# Patient Record
Sex: FEMALE | Race: WHITE | Employment: UNEMPLOYED | URBAN - METROPOLITAN AREA
[De-identification: names, ages, dates, MRNs, and addresses within clinical notes are randomized per-mention and may not be internally consistent; named-entity substitution may affect disease eponyms.]

---

## 2017-01-10 ENCOUNTER — OFFICE VISIT (OUTPATIENT)
Dept: FAMILY MEDICINE CLINIC | Facility: CLINIC | Age: 57
End: 2017-01-10

## 2017-01-10 VITALS
HEART RATE: 80 BPM | SYSTOLIC BLOOD PRESSURE: 140 MMHG | DIASTOLIC BLOOD PRESSURE: 84 MMHG | WEIGHT: 144 LBS | OXYGEN SATURATION: 98 % | BODY MASS INDEX: 23 KG/M2 | TEMPERATURE: 99 F | RESPIRATION RATE: 20 BRPM

## 2017-01-10 DIAGNOSIS — R05.9 COUGH: Primary | ICD-10-CM

## 2017-01-10 PROCEDURE — 99213 OFFICE O/P EST LOW 20 MIN: CPT | Performed by: FAMILY MEDICINE

## 2017-01-10 RX ORDER — AZITHROMYCIN 250 MG/1
TABLET, FILM COATED ORAL
Qty: 6 TABLET | Refills: 0 | Status: SHIPPED | COMMUNITY
Start: 2017-01-10 | End: 2018-01-09

## 2017-01-10 NOTE — PROGRESS NOTES
CHIEF COMPLAINT:   Cough  HPI:   Debbie Boyd is a 60year old female who presents for upper respiratory symptoms for  1  months. Patient reports congestion. Feels like there is an area on her left neck where it feels constricted with swallowing.   Started chest pain on exertion and palpitations. GI: no nausea or abdominal pain  NEURO: denies headaches or dizziness.     EXAM:   /84 mmHg  Pulse 80  Temp(Src) 98.6 °F (37 °C)  Resp 20  Wt 144 lb  SpO2 98%  GENERAL: well developed, well nourished,in no brenda

## 2017-04-13 ENCOUNTER — HOSPITAL ENCOUNTER (OUTPATIENT)
Dept: CT IMAGING | Facility: HOSPITAL | Age: 57
Discharge: HOME | End: 2017-04-13
Attending: FAMILY MEDICINE

## 2017-04-13 DIAGNOSIS — Z13.9 SCREENING PROCEDURE: ICD-10-CM

## 2018-01-09 PROCEDURE — 88175 CYTOPATH C/V AUTO FLUID REDO: CPT | Performed by: OBSTETRICS & GYNECOLOGY

## 2018-01-09 PROCEDURE — 87624 HPV HI-RISK TYP POOLED RSLT: CPT | Performed by: OBSTETRICS & GYNECOLOGY

## 2018-01-18 PROCEDURE — 82607 VITAMIN B-12: CPT | Performed by: OBSTETRICS & GYNECOLOGY

## 2018-01-25 ENCOUNTER — PATIENT MESSAGE (OUTPATIENT)
Dept: FAMILY MEDICINE CLINIC | Facility: CLINIC | Age: 58
End: 2018-01-25

## 2018-01-26 NOTE — TELEPHONE ENCOUNTER
Called to pt and given findings, and provider recommendations. Pt voiced understanding. Advised pt that I would need to discuss times available with Erna PATEL and call her back.   Pt voiced understanding

## 2018-01-26 NOTE — TELEPHONE ENCOUNTER
From: Georges Jean  To: Mita Jack  Sent: 1/25/2018 10:44 PM CST  Subject: Test Results Question    Dr. Emili Wiley ordered blood tests as a routine part of my annual ob/gyn physical. Upon receipt of the results, she told me I marcou

## 2018-02-02 ENCOUNTER — OFFICE VISIT (OUTPATIENT)
Dept: FAMILY MEDICINE CLINIC | Facility: CLINIC | Age: 58
End: 2018-02-02

## 2018-02-02 VITALS
HEART RATE: 76 BPM | DIASTOLIC BLOOD PRESSURE: 76 MMHG | HEIGHT: 66 IN | SYSTOLIC BLOOD PRESSURE: 130 MMHG | TEMPERATURE: 98 F | BODY MASS INDEX: 21.69 KG/M2 | WEIGHT: 135 LBS

## 2018-02-02 DIAGNOSIS — R74.8 ELEVATED LIVER ENZYMES: Primary | ICD-10-CM

## 2018-02-02 PROCEDURE — 99213 OFFICE O/P EST LOW 20 MIN: CPT | Performed by: FAMILY MEDICINE

## 2018-02-02 NOTE — PROGRESS NOTES
Penny Romberg is a 59year old female. CHIEF COMPLAINT   Elevated liver enzymes  HPI:   On average 2 drinks per week. Taking tylenol infrequently. Had illness December - was taking tylenol for about 5 days during that time.   Taking prempro currently and ultrasound and refer to GI if persistent elevation.

## 2018-03-02 ENCOUNTER — APPOINTMENT (OUTPATIENT)
Dept: LAB | Age: 58
End: 2018-03-02
Attending: FAMILY MEDICINE
Payer: COMMERCIAL

## 2018-03-02 DIAGNOSIS — R74.8 ELEVATED LIVER ENZYMES: ICD-10-CM

## 2018-03-02 LAB
ALBUMIN SERPL-MCNC: 3.6 G/DL (ref 3.5–4.8)
ALP LIVER SERPL-CCNC: 84 U/L (ref 46–118)
ALT SERPL-CCNC: 57 U/L (ref 14–54)
AST SERPL-CCNC: 33 U/L (ref 15–41)
BILIRUB DIRECT SERPL-MCNC: 0.1 MG/DL (ref 0.1–0.5)
BILIRUB SERPL-MCNC: 0.5 MG/DL (ref 0.1–2)
M PROTEIN MFR SERPL ELPH: 7.2 G/DL (ref 6.1–8.3)

## 2018-03-02 PROCEDURE — 36415 COLL VENOUS BLD VENIPUNCTURE: CPT | Performed by: FAMILY MEDICINE

## 2018-03-02 PROCEDURE — 80076 HEPATIC FUNCTION PANEL: CPT | Performed by: FAMILY MEDICINE

## 2018-03-05 DIAGNOSIS — R74.8 ELEVATED LIVER ENZYMES: Primary | ICD-10-CM

## 2018-04-13 ENCOUNTER — APPOINTMENT (OUTPATIENT)
Dept: LAB | Age: 58
End: 2018-04-13
Attending: FAMILY MEDICINE
Payer: COMMERCIAL

## 2018-04-13 DIAGNOSIS — R74.8 ELEVATED LIVER ENZYMES: ICD-10-CM

## 2018-04-13 PROCEDURE — 36415 COLL VENOUS BLD VENIPUNCTURE: CPT | Performed by: FAMILY MEDICINE

## 2018-04-13 PROCEDURE — 80076 HEPATIC FUNCTION PANEL: CPT | Performed by: FAMILY MEDICINE

## 2018-04-17 DIAGNOSIS — R74.01 ELEVATED ALT MEASUREMENT: Primary | ICD-10-CM

## 2018-04-17 DIAGNOSIS — R74.01 ELEVATED AST (SGOT): ICD-10-CM

## 2018-04-24 ENCOUNTER — HOSPITAL ENCOUNTER (OUTPATIENT)
Dept: ULTRASOUND IMAGING | Age: 58
Discharge: HOME | End: 2018-04-24
Attending: FAMILY MEDICINE
Payer: COMMERCIAL

## 2018-04-24 DIAGNOSIS — R74.01 ELEVATED AST (SGOT): ICD-10-CM

## 2018-04-24 DIAGNOSIS — R74.01 ELEVATED ALT MEASUREMENT: ICD-10-CM

## 2018-04-24 PROCEDURE — 76700 US EXAM ABDOM COMPLETE: CPT | Performed by: FAMILY MEDICINE

## 2018-04-25 ENCOUNTER — TELEPHONE (OUTPATIENT)
Dept: FAMILY MEDICINE CLINIC | Facility: CLINIC | Age: 58
End: 2018-04-25

## 2018-04-25 DIAGNOSIS — K82.4 GALL BLADDER POLYP: ICD-10-CM

## 2018-04-25 DIAGNOSIS — R74.8 ELEVATED LIVER ENZYMES: ICD-10-CM

## 2018-04-25 DIAGNOSIS — K80.20 CALCULUS OF GALLBLADDER WITHOUT CHOLECYSTITIS WITHOUT OBSTRUCTION: Primary | ICD-10-CM

## 2018-04-27 ENCOUNTER — OFFICE VISIT (OUTPATIENT)
Dept: SURGERY | Facility: CLINIC | Age: 58
End: 2018-04-27

## 2018-04-27 VITALS
HEIGHT: 66 IN | WEIGHT: 135 LBS | TEMPERATURE: 98 F | HEART RATE: 71 BPM | BODY MASS INDEX: 21.69 KG/M2 | SYSTOLIC BLOOD PRESSURE: 135 MMHG | DIASTOLIC BLOOD PRESSURE: 84 MMHG

## 2018-04-27 DIAGNOSIS — K80.20 CALCULUS OF GALLBLADDER WITHOUT CHOLECYSTITIS WITHOUT OBSTRUCTION: ICD-10-CM

## 2018-04-27 DIAGNOSIS — R74.8 ELEVATED LIVER ENZYMES: Primary | ICD-10-CM

## 2018-04-27 PROCEDURE — 99243 OFF/OP CNSLTJ NEW/EST LOW 30: CPT | Performed by: SURGERY

## 2018-04-27 NOTE — H&P
New Patient Visit Note       Active Problems      1. Elevated liver enzymes    2.  Calculus of gallbladder without cholecystitis without obstruction        Chief Complaint   Patient presents with:  Gallbladder: New pt ref by Dr. Román Resendiz, elevated liver lev Years of education:                 Number of children:               Social History Main Topics    Smoking status: Never Smoker                                                                Smokeless tobacco: Never Used Normal range of motion. Cardiovascular: Normal rate and regular rhythm. Pulmonary/Chest: Effort normal and breath sounds normal. No respiratory distress. She has no wheezes. Abdominal: She exhibits no distension. There is no tenderness.  There is no

## 2018-05-17 ENCOUNTER — APPOINTMENT (OUTPATIENT)
Dept: LAB | Age: 58
End: 2018-05-17
Attending: FAMILY MEDICINE
Payer: COMMERCIAL

## 2018-05-17 DIAGNOSIS — Z01.818 PRE-OP TESTING: ICD-10-CM

## 2018-05-17 PROCEDURE — 80048 BASIC METABOLIC PNL TOTAL CA: CPT | Performed by: FAMILY MEDICINE

## 2018-05-17 PROCEDURE — 36415 COLL VENOUS BLD VENIPUNCTURE: CPT | Performed by: FAMILY MEDICINE

## 2018-05-18 ENCOUNTER — TELEPHONE (OUTPATIENT)
Dept: SURGERY | Facility: CLINIC | Age: 58
End: 2018-05-18

## 2018-05-30 RX ORDER — SODIUM CHLORIDE, SODIUM LACTATE, POTASSIUM CHLORIDE, CALCIUM CHLORIDE 600; 310; 30; 20 MG/100ML; MG/100ML; MG/100ML; MG/100ML
INJECTION, SOLUTION INTRAVENOUS CONTINUOUS
Status: CANCELLED | OUTPATIENT
Start: 2018-05-30

## 2018-06-04 ENCOUNTER — ANESTHESIA EVENT (OUTPATIENT)
Dept: SURGERY | Facility: HOSPITAL | Age: 58
End: 2018-06-04
Payer: COMMERCIAL

## 2018-06-04 ENCOUNTER — APPOINTMENT (OUTPATIENT)
Dept: GENERAL RADIOLOGY | Facility: HOSPITAL | Age: 58
End: 2018-06-04
Attending: SURGERY
Payer: COMMERCIAL

## 2018-06-04 ENCOUNTER — ANESTHESIA (OUTPATIENT)
Dept: SURGERY | Facility: HOSPITAL | Age: 58
End: 2018-06-04
Payer: COMMERCIAL

## 2018-06-04 ENCOUNTER — HOSPITAL ENCOUNTER (OUTPATIENT)
Facility: HOSPITAL | Age: 58
Setting detail: HOSPITAL OUTPATIENT SURGERY
Discharge: HOME | End: 2018-06-04
Attending: SURGERY | Admitting: SURGERY
Payer: COMMERCIAL

## 2018-06-04 ENCOUNTER — SURGERY (OUTPATIENT)
Age: 58
End: 2018-06-04

## 2018-06-04 VITALS
SYSTOLIC BLOOD PRESSURE: 131 MMHG | WEIGHT: 139.56 LBS | RESPIRATION RATE: 16 BRPM | TEMPERATURE: 98 F | HEART RATE: 76 BPM | HEIGHT: 66 IN | DIASTOLIC BLOOD PRESSURE: 73 MMHG | OXYGEN SATURATION: 100 % | BODY MASS INDEX: 22.43 KG/M2

## 2018-06-04 DIAGNOSIS — K80.20 CALCULUS OF GALLBLADDER WITHOUT CHOLECYSTITIS WITHOUT OBSTRUCTION: ICD-10-CM

## 2018-06-04 DIAGNOSIS — R74.8 ELEVATED LIVER ENZYMES: ICD-10-CM

## 2018-06-04 PROCEDURE — 88304 TISSUE EXAM BY PATHOLOGIST: CPT | Performed by: SURGERY

## 2018-06-04 PROCEDURE — 0FT44ZZ RESECTION OF GALLBLADDER, PERCUTANEOUS ENDOSCOPIC APPROACH: ICD-10-PCS | Performed by: SURGERY

## 2018-06-04 PROCEDURE — 74300 X-RAY BILE DUCTS/PANCREAS: CPT | Performed by: SURGERY

## 2018-06-04 PROCEDURE — BF101ZZ FLUOROSCOPY OF BILE DUCTS USING LOW OSMOLAR CONTRAST: ICD-10-PCS | Performed by: SURGERY

## 2018-06-04 PROCEDURE — 0WQF0ZZ REPAIR ABDOMINAL WALL, OPEN APPROACH: ICD-10-PCS | Performed by: SURGERY

## 2018-06-04 RX ORDER — IBUPROFEN 200 MG
600 TABLET ORAL ONCE AS NEEDED
Status: DISCONTINUED | OUTPATIENT
Start: 2018-06-04 | End: 2018-06-04

## 2018-06-04 RX ORDER — HYDROCODONE BITARTRATE AND ACETAMINOPHEN 5; 325 MG/1; MG/1
1-2 TABLET ORAL EVERY 6 HOURS PRN
Qty: 20 TABLET | Refills: 0 | Status: SHIPPED | OUTPATIENT
Start: 2018-06-04 | End: 2018-06-04

## 2018-06-04 RX ORDER — ONDANSETRON 2 MG/ML
INJECTION INTRAMUSCULAR; INTRAVENOUS
Status: COMPLETED
Start: 2018-06-04 | End: 2018-06-04

## 2018-06-04 RX ORDER — HYDROCODONE BITARTRATE AND ACETAMINOPHEN 5; 325 MG/1; MG/1
2 TABLET ORAL AS NEEDED
Status: COMPLETED | OUTPATIENT
Start: 2018-06-04 | End: 2018-06-04

## 2018-06-04 RX ORDER — HYDROMORPHONE HYDROCHLORIDE 1 MG/ML
0.5 INJECTION, SOLUTION INTRAMUSCULAR; INTRAVENOUS; SUBCUTANEOUS EVERY 5 MIN PRN
Status: DISCONTINUED | OUTPATIENT
Start: 2018-06-04 | End: 2018-06-04

## 2018-06-04 RX ORDER — ACETAMINOPHEN 500 MG
1000 TABLET ORAL ONCE
Status: DISCONTINUED | OUTPATIENT
Start: 2018-06-04 | End: 2018-06-04 | Stop reason: HOSPADM

## 2018-06-04 RX ORDER — HYDROCODONE BITARTRATE AND ACETAMINOPHEN 5; 325 MG/1; MG/1
TABLET ORAL
Status: DISCONTINUED
Start: 2018-06-04 | End: 2018-06-04

## 2018-06-04 RX ORDER — ACETAMINOPHEN 500 MG
1000 TABLET ORAL ONCE
Status: ON HOLD | COMMUNITY
End: 2018-06-04

## 2018-06-04 RX ORDER — MIDAZOLAM HYDROCHLORIDE 1 MG/ML
1 INJECTION INTRAMUSCULAR; INTRAVENOUS EVERY 5 MIN PRN
Status: DISCONTINUED | OUTPATIENT
Start: 2018-06-04 | End: 2018-06-04

## 2018-06-04 RX ORDER — HYDROCODONE BITARTRATE AND ACETAMINOPHEN 5; 325 MG/1; MG/1
1 TABLET ORAL AS NEEDED
Status: COMPLETED | OUTPATIENT
Start: 2018-06-04 | End: 2018-06-04

## 2018-06-04 RX ORDER — HYDROCODONE BITARTRATE AND ACETAMINOPHEN 5; 325 MG/1; MG/1
1-2 TABLET ORAL EVERY 6 HOURS PRN
Qty: 20 TABLET | Refills: 0 | Status: SHIPPED | OUTPATIENT
Start: 2018-06-04 | End: 2018-06-20 | Stop reason: ALTCHOICE

## 2018-06-04 RX ORDER — ONDANSETRON 2 MG/ML
4 INJECTION INTRAMUSCULAR; INTRAVENOUS AS NEEDED
Status: DISCONTINUED | OUTPATIENT
Start: 2018-06-04 | End: 2018-06-04

## 2018-06-04 RX ORDER — BUPIVACAINE HYDROCHLORIDE AND EPINEPHRINE 5; 5 MG/ML; UG/ML
INJECTION, SOLUTION EPIDURAL; INTRACAUDAL; PERINEURAL AS NEEDED
Status: DISCONTINUED | OUTPATIENT
Start: 2018-06-04 | End: 2018-06-04 | Stop reason: HOSPADM

## 2018-06-04 RX ORDER — SODIUM CHLORIDE, SODIUM LACTATE, POTASSIUM CHLORIDE, CALCIUM CHLORIDE 600; 310; 30; 20 MG/100ML; MG/100ML; MG/100ML; MG/100ML
INJECTION, SOLUTION INTRAVENOUS CONTINUOUS
Status: DISCONTINUED | OUTPATIENT
Start: 2018-06-04 | End: 2018-06-04

## 2018-06-04 RX ORDER — HEPARIN SODIUM 5000 [USP'U]/ML
5000 INJECTION, SOLUTION INTRAVENOUS; SUBCUTANEOUS ONCE
Status: COMPLETED | OUTPATIENT
Start: 2018-06-04 | End: 2018-06-04

## 2018-06-04 RX ORDER — NALOXONE HYDROCHLORIDE 0.4 MG/ML
80 INJECTION, SOLUTION INTRAMUSCULAR; INTRAVENOUS; SUBCUTANEOUS AS NEEDED
Status: DISCONTINUED | OUTPATIENT
Start: 2018-06-04 | End: 2018-06-04

## 2018-06-04 RX ORDER — ACETAMINOPHEN 500 MG
1000 TABLET ORAL ONCE AS NEEDED
Status: DISCONTINUED | OUTPATIENT
Start: 2018-06-04 | End: 2018-06-04

## 2018-06-04 RX ORDER — MEPERIDINE HYDROCHLORIDE 25 MG/ML
12.5 INJECTION INTRAMUSCULAR; INTRAVENOUS; SUBCUTANEOUS AS NEEDED
Status: DISCONTINUED | OUTPATIENT
Start: 2018-06-04 | End: 2018-06-04

## 2018-06-04 RX ORDER — METOCLOPRAMIDE HYDROCHLORIDE 5 MG/ML
10 INJECTION INTRAMUSCULAR; INTRAVENOUS AS NEEDED
Status: DISCONTINUED | OUTPATIENT
Start: 2018-06-04 | End: 2018-06-04

## 2018-06-04 NOTE — ANESTHESIA PREPROCEDURE EVALUATION
PRE-OP EVALUATION    Patient Name: Ben Marie    Pre-op Diagnosis: Elevated liver enzymes [R74.8]  Calculus of gallbladder without cholecystitis without obstruction [K80.20]    Procedure(s):  LAPAROSCOPIC CHOLECYSTECTOMY WITH CHOLANGIOGRAM    Surgeon(s) 05/17/2018   CO2 28.0 05/17/2018   BUN 17 05/17/2018   CREATSERUM 0.97 05/17/2018   GLU 88 05/17/2018   CA 9.1 05/17/2018            Airway      Mallampati: I  Mouth opening: >3 FB  TM distance: > 6 cm  Neck ROM: full Cardiovascular    Cardiovascular exam

## 2018-06-04 NOTE — OPERATIVE REPORT
[facility]   Operative Note    Rebecca Ortiz Location: OR   CSN [id number] MRN [id number]   Admission Date 6/4/2018 Operation Date 6/4/2018   Attending Physician Maryann Carpenter MD Operating Physician Katlyn Light MD     Date of procedure:   6-4-201 patient including but not limited to bleeding, infections, seroma/hematoma formation, postoperative wound complications including development of a hernia, trocar injury, intra-abdominal organ injury, bile leakage, biloma formation, common bile duct injury, the right side up. Initial evaluation of the right upper quadrant revealed the gallbladder to be mildly distended. The gallbladder was grasped at the fundus and elevated superiorly.    Maya's pouch was identified and this was retracted laterally to cystic artery were examined and found to be well approximated and in good position. There was no evidence of bleeding or bile leakage from the liver bed.   The accessory ports were removed under direct vision and there was no evidence of bleeding from the a

## 2018-06-04 NOTE — BRIEF OP NOTE
Pre-Operative Diagnosis: cholelithiasis     Post-Operative Diagnosis: cholelithiasis, umbilical hernia      Procedure Performed:   Procedure(s):  LAPAROSCOPIC CHOLECYSTECTOMY WITH CHOLANGIOGRAM, UMBILICAL HERNIA REPAIR    Surgeon(s) and Role:     Bill Jenkins,

## 2018-06-04 NOTE — H&P
Active Problems      1. Elevated liver enzymes    2. Calculus of gallbladder without cholecystitis without obstruction          Chief Complaint   Patient presents with:  Gallbladder: New pt ref by Dr. Marcello Uribe, elevated liver levels on blood work.  US of Marital status:              Spouse name:                       Years of education:                 Number of children:                Social History Main Topics    Smoking status: Never Smoker Eyes: EOM are normal. Pupils are equal, round, and reactive to light. No scleral icterus. Neck: Normal range of motion. Cardiovascular: Normal rate and regular rhythm. Pulmonary/Chest: Effort normal and breath sounds normal. No respiratory distress.

## 2018-06-04 NOTE — ANESTHESIA POSTPROCEDURE EVALUATION
[address] Patient Status:  Hospital Outpatient Surgery   Age/Gender 59year old female MRN [id number]   [facility] SURGERY Attending Paresh Rogers, [facility] Day # 0 PCP Esvin Osorio MD       Anesthesia Post-op N

## 2018-06-20 ENCOUNTER — OFFICE VISIT (OUTPATIENT)
Dept: SURGERY | Facility: CLINIC | Age: 58
End: 2018-06-20

## 2018-06-20 VITALS
DIASTOLIC BLOOD PRESSURE: 80 MMHG | HEART RATE: 84 BPM | BODY MASS INDEX: 21.69 KG/M2 | HEIGHT: 66 IN | SYSTOLIC BLOOD PRESSURE: 118 MMHG | TEMPERATURE: 99 F | WEIGHT: 135 LBS

## 2018-06-20 DIAGNOSIS — K81.1 CHRONIC CHOLECYSTITIS: ICD-10-CM

## 2018-06-20 DIAGNOSIS — K82.8 BILIARY DYSKINESIA: Primary | ICD-10-CM

## 2018-06-20 PROCEDURE — 99024 POSTOP FOLLOW-UP VISIT: CPT | Performed by: PHYSICIAN ASSISTANT

## 2018-06-20 NOTE — PROGRESS NOTES
Post Operative Visit Note       Active Problems  1. Biliary dyskinesia    2.  Chronic cholecystitis         Chief Complaint   Patient presents with:  Post-Op: 6/4 Lap Choley, eating ok, BMs fine, tenderness on right side    History of Present Illness   The Brother      Social History    Marital status:              Spouse name:                       Years of education:                 Number of children:               Social History Main Topics    Smoking status: Never Smoker Neurological: She is alert and oriented to person, place, and time. Skin: Skin is warm and dry. No rash noted. She is not diaphoretic. No erythema. Psychiatric: She has a normal mood and affect.  Her behavior is normal. Judgment and thought content no

## 2020-04-16 ENCOUNTER — TELEPHONE (OUTPATIENT)
Dept: FAMILY MEDICINE CLINIC | Facility: CLINIC | Age: 60
End: 2020-04-16

## 2020-04-16 NOTE — TELEPHONE ENCOUNTER
Condition update mess received re medroxyprogesterone. Call to pt-sts dr Francis Malagon discontinued this med and placed her on prempro. Pt sts she updated in [employer] since med was still in her record. Advised will update record.  Pt voices thanks  Record upd

## 2020-04-21 ENCOUNTER — PATIENT MESSAGE (OUTPATIENT)
Dept: FAMILY MEDICINE CLINIC | Facility: CLINIC | Age: 60
End: 2020-04-21

## 2020-04-21 ENCOUNTER — TELEMEDICINE (OUTPATIENT)
Dept: FAMILY MEDICINE CLINIC | Facility: CLINIC | Age: 60
End: 2020-04-21

## 2020-04-21 ENCOUNTER — HOSPITAL ENCOUNTER (OUTPATIENT)
Dept: GENERAL RADIOLOGY | Age: 60
Discharge: HOME | End: 2020-04-21
Attending: FAMILY MEDICINE
Payer: COMMERCIAL

## 2020-04-21 DIAGNOSIS — S69.92XA INJURY OF LEFT HAND, INITIAL ENCOUNTER: ICD-10-CM

## 2020-04-21 DIAGNOSIS — S69.92XA INJURY OF FINGER OF LEFT HAND, INITIAL ENCOUNTER: Primary | ICD-10-CM

## 2020-04-21 DIAGNOSIS — S69.92XA INJURY OF FINGER OF LEFT HAND, INITIAL ENCOUNTER: ICD-10-CM

## 2020-04-21 PROCEDURE — 99213 OFFICE O/P EST LOW 20 MIN: CPT | Performed by: FAMILY MEDICINE

## 2020-04-21 PROCEDURE — 73140 X-RAY EXAM OF FINGER(S): CPT | Performed by: FAMILY MEDICINE

## 2020-04-21 PROCEDURE — 73130 X-RAY EXAM OF HAND: CPT | Performed by: FAMILY MEDICINE

## 2020-04-21 NOTE — PROGRESS NOTES
Molly Mendenhall is a 62year old female. CHIEF COMPLAINT   Left hand injury  HPI:   This visit is conducted using Telemedicine with live, interactive video and audio.  Patient understands and accepts financial responsibility for any deductible, co-insurance Consults:  XR HAND (MIN 3 VIEWS), LEFT (CPT=73130)  XR FINGER(S) (MIN 2 VIEWS), LEFT 2ND (CPT=73140)    The patient indicates understanding of these issues and agrees to the plan. Will follow up with results of above.       Patient is aware that a video vi

## 2020-04-21 NOTE — TELEPHONE ENCOUNTER
From: Kraig Swenson  To: Lucina Reardon PA-C  Sent: 4/21/2020 1:15 PM CDT  Subject: Hansa Kins,    I am hoping you can help - I fell 2 1/2 weeks ago and vowed to not go near an emergency room or urgent care because Ricky Thomas and I have been quarant

## 2020-04-21 NOTE — TELEPHONE ENCOUNTER
See note below. I was not sure if you wanted her to possibly be seen in the office or go to the [facility].  Please advise

## 2020-08-05 ENCOUNTER — PATIENT MESSAGE (OUTPATIENT)
Dept: FAMILY MEDICINE CLINIC | Facility: CLINIC | Age: 60
End: 2020-08-05

## 2020-08-05 NOTE — TELEPHONE ENCOUNTER
Called to pt and advised her of Dr Elester Lefort recommendations. Pt voiced understanding and agreed to plan. Address given to pt. Also advised that in situations that she needs a quick response she should call as mycharts can take 48-72 hours.   Pt voiced unde

## 2020-08-05 NOTE — TELEPHONE ENCOUNTER
I would have all of them tested. Please refer them to the [address] [facility] in [place] for drive-through testing.

## 2021-01-18 ENCOUNTER — OFFICE VISIT (OUTPATIENT)
Dept: ORTHOPEDICS CLINIC | Facility: CLINIC | Age: 61
End: 2021-01-18
Payer: COMMERCIAL

## 2021-01-18 ENCOUNTER — APPOINTMENT (OUTPATIENT)
Dept: PHYSICAL THERAPY | Age: 61
End: 2021-01-18
Attending: ORTHOPAEDIC SURGERY
Payer: COMMERCIAL

## 2021-01-18 DIAGNOSIS — M75.02 ADHESIVE CAPSULITIS OF LEFT SHOULDER: Primary | ICD-10-CM

## 2021-01-18 PROCEDURE — 99203 OFFICE O/P NEW LOW 30 MIN: CPT | Performed by: ORTHOPAEDIC SURGERY

## 2021-01-18 PROCEDURE — 20610 DRAIN/INJ JOINT/BURSA W/O US: CPT | Performed by: ORTHOPAEDIC SURGERY

## 2021-01-18 RX ORDER — TRIAMCINOLONE ACETONIDE 40 MG/ML
40 INJECTION, SUSPENSION INTRA-ARTICULAR; INTRAMUSCULAR ONCE
Status: COMPLETED | OUTPATIENT
Start: 2021-01-18 | End: 2021-01-18

## 2021-01-18 RX ADMIN — TRIAMCINOLONE ACETONIDE 40 MG: 40 INJECTION, SUSPENSION INTRA-ARTICULAR; INTRAMUSCULAR at 11:15:00

## 2021-01-18 NOTE — PROGRESS NOTES
[facility] New Patient Note    CC: Patient presents with:  Shoulder Injury: left shoulder pain S/P fall 4/2020  Hand Injury: hand pain since fall in 4-2020      HPI: The patient is a 56year old female who presents today with complaints of pers 3   • Estradiol 0.1 MG/GM Vaginal Cream Estradiol 0.2mg/Gram: insert 1Gm vaginally QHS x 2 weeks, then Mon-Fri x 2 weeks. Disp: 45G tube 1 Tube 0     No Known Allergies  Family History   Problem Relation Age of Onset   • Heart Disorder Father    • Other (Pu reasonable to offer a intra-articular cortisone injection to decrease the inflammatory phase of her condition. This should be supplemented by physical therapy for joint mobilization, passive stretching and light strengthening as her function improves.   Mei Tam

## 2021-01-19 ENCOUNTER — TELEPHONE (OUTPATIENT)
Dept: PHYSICAL THERAPY | Facility: HOSPITAL | Age: 61
End: 2021-01-19

## 2021-01-20 ENCOUNTER — OFFICE VISIT (OUTPATIENT)
Dept: PHYSICAL THERAPY | Age: 61
End: 2021-01-20
Attending: ORTHOPAEDIC SURGERY
Payer: COMMERCIAL

## 2021-01-20 DIAGNOSIS — M75.02 ADHESIVE CAPSULITIS OF LEFT SHOULDER: ICD-10-CM

## 2021-01-20 PROCEDURE — 97161 PT EVAL LOW COMPLEX 20 MIN: CPT

## 2021-01-20 PROCEDURE — 97140 MANUAL THERAPY 1/> REGIONS: CPT

## 2021-01-20 NOTE — PROGRESS NOTES
INITIAL EVALUATION:   Referring Physician: Dr. Brooklyn Sands  Diagnosis: Adhesive capsulitis of left shoulder (M75.02)     Date of Service: 1/20/2021     PATIENT SUMMARY   Penny Romberg is a 56year old  R handedfemale who presents to therapy today with complaints bowel/bladder changes, fevers, unexpected weight loss/gain, and NO LE N/T    ASSESSMENT:    Diamond Lafleur presents to physical therapy evaluation with primary c/o L shoulder pain and stiffness.  Pt presents with capsular loss of ROM, negative cervical screen, fair 3x daily - 7x weekly  Supine Shoulder External Rotation in 45 Degrees Abduction AAROM with Dowel - 10 reps - 3x daily - 7x weekly    Charges: PT Eval Low Complexity, Manual x 1      Total Timed Treatment: 15 min     Total Treatment Time: 45 min  HERBER santiago

## 2021-01-22 ENCOUNTER — OFFICE VISIT (OUTPATIENT)
Dept: PHYSICAL THERAPY | Age: 61
End: 2021-01-22
Attending: ORTHOPAEDIC SURGERY
Payer: COMMERCIAL

## 2021-01-22 PROCEDURE — 97110 THERAPEUTIC EXERCISES: CPT

## 2021-01-22 PROCEDURE — 97140 MANUAL THERAPY 1/> REGIONS: CPT

## 2021-01-22 NOTE — PROGRESS NOTES
Dx: Adhesive capsulitis of left shoulder (M75.02)             Insurance (Authorized # of Visits):  [facility] PPO (no Meryl Dill)           Authorizing Physician: Dr. Lisandro Mcmullen  Next MD visit: none scheduled  Fall Risk: standard         Precautions: n/a             Subject - 10 reps - 3x daily - 7x weekly  Supine Chest Stretch with Elbows Bent - 10 reps - 3x daily - 7x weekly  Sleeper Stretch - 10 reps - 3x daily - 7x weekly    Charges: Manual x 2, TherEx x 1       Total Timed Treatment: 40 min  Total Treatment Time: 40 min

## 2021-01-26 ENCOUNTER — OFFICE VISIT (OUTPATIENT)
Dept: PHYSICAL THERAPY | Age: 61
End: 2021-01-26
Attending: ORTHOPAEDIC SURGERY
Payer: COMMERCIAL

## 2021-01-26 PROCEDURE — 97110 THERAPEUTIC EXERCISES: CPT

## 2021-01-26 PROCEDURE — 97140 MANUAL THERAPY 1/> REGIONS: CPT

## 2021-01-26 NOTE — PROGRESS NOTES
Dx: Adhesive capsulitis of left shoulder (M75.02)             Insurance (Authorized # of Visits):  [facility] PPO (manuel Jeffrey)           Authorizing Physician: Dr. Annalee Winter  Next MD visit: none scheduled  Fall Risk: standard         Precautions: n/a             Subject reps - 3x daily - 7x weekly  Supine Shoulder External Internal Rotation AAROM with Dowel - 10 reps - 3x daily - 7x weekly  Supine Chest Stretch with Elbows Bent - 10 reps - 3x daily - 7x weekly  Sleeper Stretch - 10 reps - 3x daily - 7x weekly    Charges:

## 2021-01-28 ENCOUNTER — OFFICE VISIT (OUTPATIENT)
Dept: PHYSICAL THERAPY | Age: 61
End: 2021-01-28
Attending: ORTHOPAEDIC SURGERY
Payer: COMMERCIAL

## 2021-01-28 PROCEDURE — 97110 THERAPEUTIC EXERCISES: CPT

## 2021-01-28 PROCEDURE — 97140 MANUAL THERAPY 1/> REGIONS: CPT

## 2021-01-28 NOTE — PROGRESS NOTES
Dx: Adhesive capsulitis of left shoulder (M75.02)             Insurance (Authorized # of Visits):  [facility] PPO (no Riana Gordillo)           Authorizing Physician: Dr. Charo Jones  Next MD visit: none scheduled  Fall Risk: standard         Precautions: n/a             Subject ADD stretch w/ manual scap block 10 x 10\"H     NMREED:       HEP:   Access Code: [email or web address]  Exercises  Standing shoulder flexion wall slides - 10 reps - 5 sec hold - 3x daily - 7x weekly  Doorway Pec Stretch at 90 Degrees Abduction - 10 reps - 3 sets - 10

## 2021-02-02 ENCOUNTER — APPOINTMENT (OUTPATIENT)
Dept: PHYSICAL THERAPY | Age: 61
End: 2021-02-02
Attending: ORTHOPAEDIC SURGERY
Payer: COMMERCIAL

## 2021-02-04 ENCOUNTER — OFFICE VISIT (OUTPATIENT)
Dept: PHYSICAL THERAPY | Age: 61
End: 2021-02-04
Attending: ORTHOPAEDIC SURGERY
Payer: COMMERCIAL

## 2021-02-04 PROCEDURE — 97110 THERAPEUTIC EXERCISES: CPT

## 2021-02-04 PROCEDURE — 97140 MANUAL THERAPY 1/> REGIONS: CPT

## 2021-02-04 NOTE — PROGRESS NOTES
Dx: Adhesive capsulitis of left shoulder (M75.02)             Insurance (Authorized # of Visits):  [facility] SARAO (manuel Dewitt)           Authorizing Physician: Dr. Gregory Soria  Next MD visit: none scheduled  Fall Risk: standard         Precautions: n/a             Subject on wall and pulleys THEREX:  UBE 3'/3'  Wand IR 3  x 5 ea  IR up back w/ pulley x 10  YTB ER x 15 THEREX:  UBE 3'/3'  SB Wall Flexion w/ OP x 10  Doorway pec stretch 10\" x 3  Strap IR Stretch x 10  Horizontal ADD stretch w/ manual scap block 10 x 10\

## 2021-02-09 ENCOUNTER — APPOINTMENT (OUTPATIENT)
Dept: PHYSICAL THERAPY | Age: 61
End: 2021-02-09
Attending: ORTHOPAEDIC SURGERY
Payer: COMMERCIAL

## 2021-02-16 ENCOUNTER — APPOINTMENT (OUTPATIENT)
Dept: PHYSICAL THERAPY | Age: 61
End: 2021-02-16
Attending: ORTHOPAEDIC SURGERY
Payer: COMMERCIAL

## 2021-02-17 ENCOUNTER — OFFICE VISIT (OUTPATIENT)
Dept: PHYSICAL THERAPY | Age: 61
End: 2021-02-17
Attending: ORTHOPAEDIC SURGERY
Payer: COMMERCIAL

## 2021-02-17 PROCEDURE — 97110 THERAPEUTIC EXERCISES: CPT

## 2021-02-17 NOTE — PROGRESS NOTES
Discharge Summary  Pt has attended 6 visits in Physical Therapy.    Dx: Adhesive capsulitis of left shoulder (M75.02)             Insurance (Authorized # of Visits):  [facility] PPO (manuel Ngo)           Authorizing Physician: Dr. Colette Alvarez  Next MD visit: none schedul questions, please contact me at Dept: [phone]. Sincerely,  Electronically signed by therapist: Anjelica Cates PT     Physician's certification required:  No  Please co-sign or sign and return this letter via fax as soon as possible to [phone]. Rotation Stretch with Hands Behind Back - 3 sets - 30 sec hold - 7x weekly  Shoulder External Rotation with Anchored Resistance - 15 reps - 3 sets - 7x weekly    Charges:  TherEx x2       Total Timed Treatment: 30 min  Total Treatment Time: 30 min

## 2022-09-20 ENCOUNTER — OFFICE VISIT (OUTPATIENT)
Dept: FAMILY MEDICINE CLINIC | Facility: CLINIC | Age: 62
End: 2022-09-20

## 2022-09-20 VITALS
RESPIRATION RATE: 18 BRPM | HEART RATE: 72 BPM | BODY MASS INDEX: 24.78 KG/M2 | HEIGHT: 66 IN | TEMPERATURE: 98 F | DIASTOLIC BLOOD PRESSURE: 88 MMHG | SYSTOLIC BLOOD PRESSURE: 132 MMHG | WEIGHT: 154.19 LBS

## 2022-09-20 DIAGNOSIS — L03.011 PARONYCHIA OF FINGER, RIGHT: Primary | ICD-10-CM

## 2022-09-20 DIAGNOSIS — Z12.12 SCREENING FOR COLORECTAL CANCER: ICD-10-CM

## 2022-09-20 DIAGNOSIS — Z12.11 SCREENING FOR COLORECTAL CANCER: ICD-10-CM

## 2022-09-20 PROCEDURE — 3075F SYST BP GE 130 - 139MM HG: CPT | Performed by: FAMILY MEDICINE

## 2022-09-20 PROCEDURE — 3079F DIAST BP 80-89 MM HG: CPT | Performed by: FAMILY MEDICINE

## 2022-09-20 PROCEDURE — 3008F BODY MASS INDEX DOCD: CPT | Performed by: FAMILY MEDICINE

## 2022-09-20 PROCEDURE — 99213 OFFICE O/P EST LOW 20 MIN: CPT | Performed by: FAMILY MEDICINE

## 2022-09-20 RX ORDER — AMOXICILLIN AND CLAVULANATE POTASSIUM 875; 125 MG/1; MG/1
1 TABLET, FILM COATED ORAL 2 TIMES DAILY
Qty: 20 TABLET | Refills: 0 | Status: SHIPPED | OUTPATIENT
Start: 2022-09-20 | End: 2022-09-30

## 2022-10-06 ENCOUNTER — OFFICE VISIT (OUTPATIENT)
Dept: FAMILY MEDICINE CLINIC | Facility: CLINIC | Age: 62
End: 2022-10-06
Payer: COMMERCIAL

## 2022-10-06 VITALS
SYSTOLIC BLOOD PRESSURE: 132 MMHG | BODY MASS INDEX: 24.91 KG/M2 | WEIGHT: 155 LBS | TEMPERATURE: 98 F | HEART RATE: 90 BPM | RESPIRATION RATE: 20 BRPM | DIASTOLIC BLOOD PRESSURE: 70 MMHG | HEIGHT: 66 IN

## 2022-10-06 DIAGNOSIS — L03.011 PARONYCHIA OF RIGHT MIDDLE FINGER: Primary | ICD-10-CM

## 2022-10-06 PROCEDURE — 3078F DIAST BP <80 MM HG: CPT | Performed by: STUDENT IN AN ORGANIZED HEALTH CARE EDUCATION/TRAINING PROGRAM

## 2022-10-06 PROCEDURE — 3075F SYST BP GE 130 - 139MM HG: CPT | Performed by: STUDENT IN AN ORGANIZED HEALTH CARE EDUCATION/TRAINING PROGRAM

## 2022-10-06 PROCEDURE — 3008F BODY MASS INDEX DOCD: CPT | Performed by: STUDENT IN AN ORGANIZED HEALTH CARE EDUCATION/TRAINING PROGRAM

## 2022-10-06 PROCEDURE — 10060 I&D ABSCESS SIMPLE/SINGLE: CPT | Performed by: STUDENT IN AN ORGANIZED HEALTH CARE EDUCATION/TRAINING PROGRAM

## 2022-10-06 NOTE — PROCEDURES
Patient presents for I&D of paronychia. Verbal consent was obtained. The area was prepared with alcohol wipes. Nerve block was obtained using lidocaine 2% 3 mL. Area cleansed with betadine. Using a 20 gague needle, incision was made along the nailbed and serosanguinous material was expressed with manual pressure. The area was irrigated with sterile water. Bandage applied. Discharge instructions were discussed. Patient tolerated the procedure well.

## 2023-01-21 ENCOUNTER — OFFICE VISIT (OUTPATIENT)
Dept: FAMILY MEDICINE CLINIC | Facility: CLINIC | Age: 63
End: 2023-01-21
Payer: COMMERCIAL

## 2023-01-21 VITALS
RESPIRATION RATE: 16 BRPM | HEART RATE: 84 BPM | WEIGHT: 151.19 LBS | BODY MASS INDEX: 24.3 KG/M2 | DIASTOLIC BLOOD PRESSURE: 80 MMHG | TEMPERATURE: 98 F | HEIGHT: 66 IN | SYSTOLIC BLOOD PRESSURE: 128 MMHG

## 2023-01-21 DIAGNOSIS — M67.449 DIGITAL MUCINOUS CYST: Primary | ICD-10-CM

## 2023-01-21 PROCEDURE — 3079F DIAST BP 80-89 MM HG: CPT | Performed by: FAMILY MEDICINE

## 2023-01-21 PROCEDURE — 3008F BODY MASS INDEX DOCD: CPT | Performed by: FAMILY MEDICINE

## 2023-01-21 PROCEDURE — 3074F SYST BP LT 130 MM HG: CPT | Performed by: FAMILY MEDICINE

## 2023-01-21 PROCEDURE — 99213 OFFICE O/P EST LOW 20 MIN: CPT | Performed by: FAMILY MEDICINE

## 2023-03-06 ENCOUNTER — OFFICE VISIT (OUTPATIENT)
Dept: ORTHOPEDICS CLINIC | Facility: CLINIC | Age: 63
End: 2023-03-06
Payer: COMMERCIAL

## 2023-03-06 ENCOUNTER — HOSPITAL ENCOUNTER (OUTPATIENT)
Dept: GENERAL RADIOLOGY | Age: 63
Discharge: HOME | End: 2023-03-06
Attending: ORTHOPAEDIC SURGERY
Payer: COMMERCIAL

## 2023-03-06 ENCOUNTER — TELEPHONE (OUTPATIENT)
Dept: ORTHOPEDICS CLINIC | Facility: CLINIC | Age: 63
End: 2023-03-06

## 2023-03-06 VITALS — HEIGHT: 66 IN | BODY MASS INDEX: 24.27 KG/M2 | WEIGHT: 151 LBS

## 2023-03-06 DIAGNOSIS — M67.449 MUCOUS CYST OF FINGER: Primary | ICD-10-CM

## 2023-03-06 DIAGNOSIS — M67.449 MUCOUS CYST OF FINGER: ICD-10-CM

## 2023-03-06 PROCEDURE — 73140 X-RAY EXAM OF FINGER(S): CPT | Performed by: ORTHOPAEDIC SURGERY

## 2023-03-06 NOTE — PROGRESS NOTES
SURGICAL BOOKING SHEET   Name: Roya Lofton  MRN: [id number]   : 1960    Surgical Date:   5/3/23   Surgical Consent:   Right middle finger mucous cyst excision and bone spur excision   Diagnosis:    (M67.449) Mucous cyst of finger  (primary encounter diagnosis)    Procedure Codes:   Excision of DIP joint osteophyte (52505) or tendon sheath cyst (93486)   Disposition:   Outpatient   Operative Time:   15 mins   Antibiotics:   Ancef 2g   Anesthesia Type:   Local, PIV Insertion, LR 20mL/hr   Clearance:    NONE   Equipment:   Mucous Cyst: [place] Blade, Mini-C-arm, Local Off Field (0.5% marcaine + 1% lidocaine w/o Epi 1:1 mix), Size 6 Glove, 4-0 nylon, Bacitracin, Adaptic, 1 inch Coban   Positioning:   Supine with arm table on transport cart   Assistant:   Assistant: Nestor Camacho PA-C   Follow Up:   12 days with Alcira Truong   Pain Medication:   Tylenol plus Advil   Therapy:   None

## 2023-03-21 ENCOUNTER — TELEPHONE (OUTPATIENT)
Dept: ORTHOPEDICS CLINIC | Facility: CLINIC | Age: 63
End: 2023-03-21

## 2023-03-21 NOTE — TELEPHONE ENCOUNTER
Patient has questions regarding her up coming surgery. 1. The hospital told her that she isn't scheduled but she has a scheduled date of 5/3.  2. Question about what testes she needs to get done.  Please advise

## 2023-03-21 NOTE — TELEPHONE ENCOUNTER
INFORMED PATIENT IF THERE ARE ANY ISSUES WITH INSURANCE THE REFERRAL TEAM WILL CONTACT HER. PATIENT STATED UNDERSTANDING.

## 2023-04-17 RX ORDER — SODIUM CHLORIDE, SODIUM LACTATE, POTASSIUM CHLORIDE, CALCIUM CHLORIDE 600; 310; 30; 20 MG/100ML; MG/100ML; MG/100ML; MG/100ML
INJECTION, SOLUTION INTRAVENOUS CONTINUOUS
Status: CANCELLED | OUTPATIENT
Start: 2023-04-17

## 2023-04-26 ENCOUNTER — APPOINTMENT (OUTPATIENT)
Dept: GENERAL RADIOLOGY | Facility: HOSPITAL | Age: 63
End: 2023-04-26
Attending: ORTHOPAEDIC SURGERY
Payer: COMMERCIAL

## 2023-04-26 ENCOUNTER — HOSPITAL ENCOUNTER (OUTPATIENT)
Facility: HOSPITAL | Age: 63
Setting detail: HOSPITAL OUTPATIENT SURGERY
Discharge: HOME | End: 2023-04-26
Attending: ORTHOPAEDIC SURGERY | Admitting: ORTHOPAEDIC SURGERY
Payer: COMMERCIAL

## 2023-04-26 ENCOUNTER — TELEPHONE (OUTPATIENT)
Dept: ORTHOPEDICS CLINIC | Facility: CLINIC | Age: 63
End: 2023-04-26

## 2023-04-26 VITALS
WEIGHT: 153 LBS | RESPIRATION RATE: 18 BRPM | SYSTOLIC BLOOD PRESSURE: 165 MMHG | TEMPERATURE: 98 F | DIASTOLIC BLOOD PRESSURE: 98 MMHG | OXYGEN SATURATION: 100 % | HEART RATE: 67 BPM | HEIGHT: 66 IN | BODY MASS INDEX: 24.59 KG/M2

## 2023-04-26 DIAGNOSIS — M67.449 MUCOUS CYST OF FINGER: ICD-10-CM

## 2023-04-26 PROCEDURE — 88304 TISSUE EXAM BY PATHOLOGIST: CPT | Performed by: ORTHOPAEDIC SURGERY

## 2023-04-26 PROCEDURE — 0JBJ0ZZ EXCISION OF RIGHT HAND SUBCUTANEOUS TISSUE AND FASCIA, OPEN APPROACH: ICD-10-PCS | Performed by: ORTHOPAEDIC SURGERY

## 2023-04-26 PROCEDURE — 76000 FLUOROSCOPY <1 HR PHYS/QHP: CPT | Performed by: ORTHOPAEDIC SURGERY

## 2023-04-26 RX ORDER — CEFAZOLIN SODIUM/WATER 2 G/20 ML
SYRINGE (ML) INTRAVENOUS AS NEEDED
Status: DISCONTINUED | OUTPATIENT
Start: 2023-04-26 | End: 2023-04-26 | Stop reason: HOSPADM

## 2023-04-26 RX ORDER — LIDOCAINE HYDROCHLORIDE 10 MG/ML
INJECTION, SOLUTION INFILTRATION; PERINEURAL AS NEEDED
Status: DISCONTINUED | OUTPATIENT
Start: 2023-04-26 | End: 2023-04-26 | Stop reason: HOSPADM

## 2023-04-26 RX ORDER — CEFAZOLIN SODIUM/WATER 2 G/20 ML
2 SYRINGE (ML) INTRAVENOUS ONCE
Status: DISCONTINUED | OUTPATIENT
Start: 2023-04-26 | End: 2023-04-26

## 2023-04-26 RX ORDER — BUPIVACAINE HYDROCHLORIDE 5 MG/ML
INJECTION, SOLUTION EPIDURAL; INTRACAUDAL AS NEEDED
Status: DISCONTINUED | OUTPATIENT
Start: 2023-04-26 | End: 2023-04-26 | Stop reason: HOSPADM

## 2023-04-26 RX ORDER — SODIUM CHLORIDE, SODIUM LACTATE, POTASSIUM CHLORIDE, CALCIUM CHLORIDE 600; 310; 30; 20 MG/100ML; MG/100ML; MG/100ML; MG/100ML
INJECTION, SOLUTION INTRAVENOUS CONTINUOUS
Status: DISCONTINUED | OUTPATIENT
Start: 2023-04-26 | End: 2023-04-26

## 2023-04-26 NOTE — TELEPHONE ENCOUNTER
Future Appointments   Date Time Provider Elvia Obrien   5/8/2023 10:20 AM Geneva Sawyer [id number]   5/12/2023  8:30 AM Vinh Collier MD [facility] GI

## 2023-04-26 NOTE — OPERATIVE REPORT
Operative Note    Patient Name: Blanca Howard    Preoperative Diagnosis:     1. Mucous cyst of right middle finger [M67.449]  2. Right middle finger DIP joint osteophyte    Postoperative Diagnosis:     3. Mucous cyst of right middle finger [M67.449]  4. Right middle finger DIP joint osteophyte    Surgeon(s) and Role:     Rody Erickson MD - Primary     Assistant: PA: JOSE Gutiérrez     A PA was needed for the successful completion of this case. She was essential for the proper positioning of patient, manipulation of instruments, proper exposure, manipulation of soft tissue, and wound closure. Procedures:     1. Right middle finger DIP joint mucous cyst excision  2. Right middle finger DIP joint osteophyte excision    Antibiotics: Ancef 2 g    Surgical Findings: Right middle finger DIP joint mucous cyst and underlying osteophyte/loose body    Anesthesia: Local    Complications: None    Specimen: Mucous cyst    Condition: Stable    Estimated Blood Loss: 1 mL    Indications:  57year old female with a symptomatic right middle finger mucous cyst and underlying osteophyte. The patient failed nonsurgical management and wished to proceed with surgical excision. The risks associated with the procedure, expected  outcome, the expected time to recovery, and the possible need for rehab required were discussed with the patient. The patient consented to the operation having understood all the above. Procedure:  Patient was met in the preoperative holding area where consent was verified, laterality was marked with the surgeon's initials, and the H&P was updated. Patient was brought to the operating room on a transport cart. Patient was transferred from the transport cart onto the operating room table and placed in a supine position. A digital block was performed. The arm was prepped and draped in the usual sterile fashion. A surgical timeout was performed. Antibiotics were fully infused.   A finger tourniquet was applied. 15 blade was used to make incision along the ulnar border of the right middle finger DIP joint. Incision was carried through the dermis. Skin flaps were created radially and ulnarly to help visualize the mucous cyst.  The mucous cyst was identified and surgically excised with the underlying capsule using a [place] blade. A rongeur was used to remove the bone spur that was noted. There is also a loose calcific body that was removed. This was previously visualized as calcification over the dorsal DIP joint on X ray preoperatively. The wound was irrigated with sterile saline. The finger tourniquet was released and hemostasis achieved with gentle pressure. Fluoroscopy was used to confirm adequate resection of the bone spur. Closure:  4-0 nylon was used to reapproximate skin edges in a simple interrupted fashion. Dressing/Splint:  Bacitracin, Adaptic, 4 x 4 gauze, and 1 inch Coban was used to hold the dressing in place. Post Operative:  Patient was taken to PACU for further recovery and discharge home. Ramu Bella MD  Hand, Wrist, & Elbow Surgery  [facility]  [facility] [address] [facility], [address] [address] [email or web address]  [email or web address]  t: [id number]  f: [phone]

## 2023-05-08 ENCOUNTER — OFFICE VISIT (OUTPATIENT)
Dept: ORTHOPEDICS CLINIC | Facility: CLINIC | Age: 63
End: 2023-05-08
Payer: COMMERCIAL

## 2023-05-08 VITALS
BODY MASS INDEX: 24.59 KG/M2 | HEIGHT: 66 IN | RESPIRATION RATE: 16 BRPM | OXYGEN SATURATION: 98 % | WEIGHT: 153 LBS | HEART RATE: 67 BPM

## 2023-05-08 DIAGNOSIS — M67.449 MUCOUS CYST OF FINGER: Primary | ICD-10-CM

## 2023-05-08 PROCEDURE — 3008F BODY MASS INDEX DOCD: CPT | Performed by: PHYSICIAN ASSISTANT

## 2023-05-08 PROCEDURE — 99024 POSTOP FOLLOW-UP VISIT: CPT | Performed by: PHYSICIAN ASSISTANT

## 2023-05-12 PROBLEM — D12.5 BENIGN NEOPLASM OF SIGMOID COLON: Status: ACTIVE | Noted: 2023-05-12

## 2023-05-12 PROBLEM — Z86.0101 HISTORY OF ADENOMATOUS POLYP OF COLON: Status: ACTIVE | Noted: 2023-05-12

## 2023-05-12 PROBLEM — Z86.010 HISTORY OF ADENOMATOUS POLYP OF COLON: Status: ACTIVE | Noted: 2023-05-12

## 2023-10-24 ENCOUNTER — TELEPHONE (OUTPATIENT)
Dept: GENETICS | Facility: HOSPITAL | Age: 63
End: 2023-10-24

## 2023-10-31 ENCOUNTER — GENETICS ENCOUNTER (OUTPATIENT)
Dept: GENETICS | Facility: HOSPITAL | Age: 63
End: 2023-10-31
Attending: GENETIC COUNSELOR, MS

## 2023-10-31 ENCOUNTER — NURSE ONLY (OUTPATIENT)
Dept: HEMATOLOGY/ONCOLOGY | Facility: HOSPITAL | Age: 63
End: 2023-10-31
Attending: GENETIC COUNSELOR, MS

## 2023-10-31 DIAGNOSIS — Z80.9 FAMILY HISTORY OF CANCER: ICD-10-CM

## 2023-10-31 DIAGNOSIS — Z17.0 MALIGNANT NEOPLASM OF RIGHT BREAST IN FEMALE, ESTROGEN RECEPTOR POSITIVE, UNSPECIFIED SITE OF BREAST: Primary | ICD-10-CM

## 2023-10-31 DIAGNOSIS — C50.911 MALIGNANT NEOPLASM OF RIGHT BREAST IN FEMALE, ESTROGEN RECEPTOR POSITIVE, UNSPECIFIED SITE OF BREAST: Primary | ICD-10-CM

## 2023-10-31 PROCEDURE — 36415 COLL VENOUS BLD VENIPUNCTURE: CPT

## 2023-10-31 PROCEDURE — 96040 HC GENETIC COUNSELING EA 30 MIN: CPT | Performed by: GENETIC COUNSELOR, MS

## 2023-11-06 ENCOUNTER — ANESTHESIA EVENT (OUTPATIENT)
Dept: SURGERY | Facility: HOSPITAL | Age: 63
End: 2023-11-06
Payer: COMMERCIAL

## 2023-11-06 ENCOUNTER — HOSPITAL ENCOUNTER (OUTPATIENT)
Facility: HOSPITAL | Age: 63
Setting detail: HOSPITAL OUTPATIENT SURGERY
Discharge: HOME | End: 2023-11-06
Attending: SURGERY | Admitting: SURGERY
Payer: COMMERCIAL

## 2023-11-06 ENCOUNTER — APPOINTMENT (OUTPATIENT)
Dept: GENERAL RADIOLOGY | Facility: HOSPITAL | Age: 63
End: 2023-11-06
Attending: SURGERY
Payer: COMMERCIAL

## 2023-11-06 ENCOUNTER — ANESTHESIA (OUTPATIENT)
Dept: SURGERY | Facility: HOSPITAL | Age: 63
End: 2023-11-06
Payer: COMMERCIAL

## 2023-11-06 VITALS
HEIGHT: 66 IN | SYSTOLIC BLOOD PRESSURE: 124 MMHG | BODY MASS INDEX: 24.27 KG/M2 | DIASTOLIC BLOOD PRESSURE: 71 MMHG | RESPIRATION RATE: 16 BRPM | OXYGEN SATURATION: 98 % | WEIGHT: 151 LBS | HEART RATE: 87 BPM | TEMPERATURE: 98 F

## 2023-11-06 PROCEDURE — 05HY33Z INSERTION OF INFUSION DEVICE INTO UPPER VEIN, PERCUTANEOUS APPROACH: ICD-10-PCS | Performed by: SURGERY

## 2023-11-06 PROCEDURE — 0JH63WZ INSERTION OF TOTALLY IMPLANTABLE VASCULAR ACCESS DEVICE INTO CHEST SUBCUTANEOUS TISSUE AND FASCIA, PERCUTANEOUS APPROACH: ICD-10-PCS | Performed by: SURGERY

## 2023-11-06 PROCEDURE — 77001 FLUOROGUIDE FOR VEIN DEVICE: CPT | Performed by: SURGERY

## 2023-11-06 DEVICE — POWERPORT CLEARVUE ISP WITH SMOOTH SEPTUM, 8F POLYURETHANE CATHETER, INTERMEDIATE KIT
Type: IMPLANTABLE DEVICE | Site: CHEST | Status: FUNCTIONAL
Brand: POWERPORT CLEARVUE

## 2023-11-06 RX ORDER — NALOXONE HYDROCHLORIDE 0.4 MG/ML
0.08 INJECTION, SOLUTION INTRAMUSCULAR; INTRAVENOUS; SUBCUTANEOUS AS NEEDED
Status: DISCONTINUED | OUTPATIENT
Start: 2023-11-06 | End: 2023-11-06

## 2023-11-06 RX ORDER — ACETAMINOPHEN 500 MG
1000 TABLET ORAL ONCE
Status: DISCONTINUED | OUTPATIENT
Start: 2023-11-06 | End: 2023-11-06 | Stop reason: HOSPADM

## 2023-11-06 RX ORDER — CEFAZOLIN SODIUM/WATER 2 G/20 ML
2 SYRINGE (ML) INTRAVENOUS ONCE
Status: COMPLETED | OUTPATIENT
Start: 2023-11-06 | End: 2023-11-06

## 2023-11-06 RX ORDER — ONDANSETRON 2 MG/ML
INJECTION INTRAMUSCULAR; INTRAVENOUS AS NEEDED
Status: DISCONTINUED | OUTPATIENT
Start: 2023-11-06 | End: 2023-11-06 | Stop reason: SURG

## 2023-11-06 RX ORDER — METOCLOPRAMIDE HYDROCHLORIDE 5 MG/ML
10 INJECTION INTRAMUSCULAR; INTRAVENOUS EVERY 8 HOURS PRN
Status: DISCONTINUED | OUTPATIENT
Start: 2023-11-06 | End: 2023-11-06

## 2023-11-06 RX ORDER — SODIUM CHLORIDE, SODIUM LACTATE, POTASSIUM CHLORIDE, CALCIUM CHLORIDE 600; 310; 30; 20 MG/100ML; MG/100ML; MG/100ML; MG/100ML
INJECTION, SOLUTION INTRAVENOUS CONTINUOUS
Status: DISCONTINUED | OUTPATIENT
Start: 2023-11-06 | End: 2023-11-06

## 2023-11-06 RX ORDER — ONDANSETRON 2 MG/ML
4 INJECTION INTRAMUSCULAR; INTRAVENOUS EVERY 6 HOURS PRN
Status: DISCONTINUED | OUTPATIENT
Start: 2023-11-06 | End: 2023-11-06

## 2023-11-06 RX ORDER — LIDOCAINE HYDROCHLORIDE AND EPINEPHRINE 10; 10 MG/ML; UG/ML
INJECTION, SOLUTION INFILTRATION; PERINEURAL AS NEEDED
Status: DISCONTINUED | OUTPATIENT
Start: 2023-11-06 | End: 2023-11-06 | Stop reason: HOSPADM

## 2023-11-06 RX ORDER — LIDOCAINE HYDROCHLORIDE 10 MG/ML
INJECTION, SOLUTION EPIDURAL; INFILTRATION; INTRACAUDAL; PERINEURAL AS NEEDED
Status: DISCONTINUED | OUTPATIENT
Start: 2023-11-06 | End: 2023-11-06 | Stop reason: SURG

## 2023-11-06 RX ORDER — SCOLOPAMINE TRANSDERMAL SYSTEM 1 MG/1
1 PATCH, EXTENDED RELEASE TRANSDERMAL ONCE
Status: DISCONTINUED | OUTPATIENT
Start: 2023-11-06 | End: 2023-11-06 | Stop reason: HOSPADM

## 2023-11-06 RX ORDER — BUPIVACAINE HYDROCHLORIDE 5 MG/ML
INJECTION, SOLUTION EPIDURAL; INTRACAUDAL AS NEEDED
Status: DISCONTINUED | OUTPATIENT
Start: 2023-11-06 | End: 2023-11-06 | Stop reason: HOSPADM

## 2023-11-06 RX ORDER — HYDROMORPHONE HYDROCHLORIDE 1 MG/ML
0.6 INJECTION, SOLUTION INTRAMUSCULAR; INTRAVENOUS; SUBCUTANEOUS EVERY 5 MIN PRN
Status: DISCONTINUED | OUTPATIENT
Start: 2023-11-06 | End: 2023-11-06

## 2023-11-06 RX ORDER — MEPERIDINE HYDROCHLORIDE 25 MG/ML
12.5 INJECTION INTRAMUSCULAR; INTRAVENOUS; SUBCUTANEOUS AS NEEDED
Status: DISCONTINUED | OUTPATIENT
Start: 2023-11-06 | End: 2023-11-06

## 2023-11-06 RX ORDER — MIDAZOLAM HYDROCHLORIDE 1 MG/ML
1 INJECTION INTRAMUSCULAR; INTRAVENOUS EVERY 5 MIN PRN
Status: DISCONTINUED | OUTPATIENT
Start: 2023-11-06 | End: 2023-11-06

## 2023-11-06 RX ORDER — PHENYLEPHRINE HCL 10 MG/ML
VIAL (ML) INJECTION AS NEEDED
Status: DISCONTINUED | OUTPATIENT
Start: 2023-11-06 | End: 2023-11-06 | Stop reason: SURG

## 2023-11-06 RX ORDER — HYDROMORPHONE HYDROCHLORIDE 1 MG/ML
0.4 INJECTION, SOLUTION INTRAMUSCULAR; INTRAVENOUS; SUBCUTANEOUS EVERY 5 MIN PRN
Status: DISCONTINUED | OUTPATIENT
Start: 2023-11-06 | End: 2023-11-06

## 2023-11-06 RX ORDER — HYDROMORPHONE HYDROCHLORIDE 1 MG/ML
0.2 INJECTION, SOLUTION INTRAMUSCULAR; INTRAVENOUS; SUBCUTANEOUS EVERY 5 MIN PRN
Status: DISCONTINUED | OUTPATIENT
Start: 2023-11-06 | End: 2023-11-06

## 2023-11-06 RX ADMIN — PHENYLEPHRINE HCL 100 MCG: 10 MG/ML VIAL (ML) INJECTION at 09:09:00

## 2023-11-06 RX ADMIN — ONDANSETRON 4 MG: 2 INJECTION INTRAMUSCULAR; INTRAVENOUS at 09:00:00

## 2023-11-06 RX ADMIN — SODIUM CHLORIDE, SODIUM LACTATE, POTASSIUM CHLORIDE, CALCIUM CHLORIDE: 600; 310; 30; 20 INJECTION, SOLUTION INTRAVENOUS at 09:29:00

## 2023-11-06 RX ADMIN — SODIUM CHLORIDE, SODIUM LACTATE, POTASSIUM CHLORIDE, CALCIUM CHLORIDE: 600; 310; 30; 20 INJECTION, SOLUTION INTRAVENOUS at 08:47:00

## 2023-11-06 RX ADMIN — CEFAZOLIN SODIUM/WATER: 2 G/20 ML SYRINGE (ML) INTRAVENOUS at 09:29:00

## 2023-11-06 RX ADMIN — CEFAZOLIN SODIUM/WATER 2 G: 2 G/20 ML SYRINGE (ML) INTRAVENOUS at 08:50:00

## 2023-11-06 RX ADMIN — LIDOCAINE HYDROCHLORIDE 50 MG: 10 INJECTION, SOLUTION EPIDURAL; INFILTRATION; INTRACAUDAL; PERINEURAL at 08:49:00

## 2023-11-06 NOTE — INTERVAL H&P NOTE
Pre-op Diagnosis: RIGHT BREAST CANCER  Here for port placement  Path pending from additional breast biopsies    The above referenced H&P was reviewed by Torri Sepulveda MD on 11/6/2023, the patient was examined and no significant changes have occurred in the patient's condition since the H&P was performed. I discussed with the patient and/or legal representative the potential benefits, risks and side effects of this procedure; the likelihood of the patient achieving goals; and potential problems that might occur during recuperation. I discussed reasonable alternatives to the procedure, including risks, benefits and side effects related to the alternatives and risks related to not receiving this procedure. We will proceed with procedure as planned.

## 2023-11-06 NOTE — OPERATIVE REPORT
PREOPERATIVE DIAGNOSIS:. Breast cancer  POSTOPERATIVE DIAGNOSIS: Same  PROCEDURE PERFORMED: Placement of a PowerPort with fluoroscopy. Asst TIFF Adamson  She assisted by retracting and suturing  DESCRIPTION OF PROCEDURE: The patient was brought into the operating room and placed on the operating table in the supine position. The chest and neck were prepped and draped in the usual sterile fashion. A skin incision was made over the deltopectoral groove on the left chest. The cephalic vein was identified in the groove. A venotomy was made. The  catheter was passed through the venotomy into the central venous system under direct fluoroscopic guidance. I then made a subcutaneous pocket on the anterior chest wall, connected the  catheter to the port, and secured the port to the chest wall using 2-0 Prolene sutures. The entire system was heparinized. Hemostasis was good. The wound was closed in layers using absorbable sutures. Steri-Strips and a sterile dressing were provided. Patient tolerated the procedure well and was taken to Same Day Surgery for observation.

## 2023-11-06 NOTE — DISCHARGE INSTRUCTIONS
Home Care Instructions Following Insertion of Your Roosevelt Morgan-  We hope you were pleased with your care at [facility].  We wish you the best outcome and overall experience with your operation. These instructions will help to minimize pain, optimize healing, and improve the likelihood of a successful result. What To Expect  There will be some spotting of the incision lines for the next 1-2 weeks. Seepage and staining of the Steri-Strips(white tape) is typical.  Tenderness, bruising, and swelling of the surgical site is common and will resolve in 2-3 weeks. Bandages (Dressing)  Keep dressing clean for 5 days  Do not remove your bandage for 5 days  Do not get your incision wet for 5 days  Gently remove your bandage in 5 days. Leave the Steri-Strips(white tape) intact. Do not remove Steri-Strips. They will loosen and fall off within 2-3 weeks. Bathing/Showers  You can resume showers tomorrow  Refrain from baths, swimming, or hot tubs for two weeks    Pain Medication  Norco: Take one or two tablets every four hours as needed for pain. Do not exceed 8 (eight) tablets each day  Do not take Norco, if you do not have pain. Over-The-Counter Medication  Non-prescription anti-inflammatory medications can also help to ease the pain. You can take Aleve or ibuprofen   Take as directed on the bottle  Drink a full glass of water with the medication. Do not take medication on an empty stomach. Home Medication  Resume your home medications as instructed    Diet  Resume your normal diet    Activity  No strenuous activity for 4 weeks. You can go up and down the stairs as tolerated. Use common sense. You can return to work, if your work is sedentary in nature. Do not return to work for 4 weeks, if your work requires strenuous activity. Call Dr. Shannon Esqueda office if you need a note for work. You cannot return to physical exercise until you are allowed to participate in strenuous activity.     Driving  Do not drive, if you are taking pain medication. Follow-up Appointment with Dr. Angelo Pate: as-needed basis  Dr. Angelo Pate will be happy to see you, if you have any concerns regarding the healing of the port site. You do not have a scheduled appointment with him. You will be evaluated by your oncologist for port care instructions. Call your oncologist for an appointment, if you do not have one arranged. Verify your appointment date, day, time, and location. At your 1st postoperative office visit with your oncologist:  Your wounds will be evaluated, healing assessed, and any additional concerns and instructions will be discussed. Questions or Concerns  Call Dr. José Luis Kennedy office if you experience severe pain not controlled by pain medication, redness, drainage, swelling, numbness, tingling, bleeding, fever, or other concerns. If your call is made after office hours, a physician's assistant or nurse practitioner will be available to help you. There is always a surgeon covering Dr. José Luis Kennedy patients, if he is unavailable. Asa Benes  Thank you for coming to [facility] for your operation. The nurses and the anesthesiologist try very hard to make sure you receive the best care possible. Your trust in them is greatly appreciated.     Thanks so much,  Dr. Angelo Pate

## 2023-11-15 ENCOUNTER — GENETICS ENCOUNTER (OUTPATIENT)
Dept: HEMATOLOGY/ONCOLOGY | Facility: HOSPITAL | Age: 63
End: 2023-11-15

## 2023-11-15 DIAGNOSIS — Z13.71 BRCA GENE MUTATION NEGATIVE IN FEMALE: Primary | ICD-10-CM

## 2023-11-15 NOTE — PROGRESS NOTES
Patient Name: Danny Costello  YOB: 1960    Referring Provider:  Aleksandr Sky MD     Reason for Referral:  Ms. Timothy Santana had genetic testing performed on 10/31/2023 because of a diagnosis of breast cancer at age 68y and a family history of cancer. Genetic Testing Result:  Negative. No pathogenic variant was found in the following 84 genes on the [employer] BRCA1/2 panel and multi-cancer + RNA panel: AIP, ALK, APC*, ABRAHAN*, AXIN2, BAP1, BARD1, BLM, BMPR1A, BRCA1, BRCA2, BRIP1, CASR, CDC73, CDH1, CDK4, CDKN1B, CDKN1C, CDKN2A (p14ARF), CDKN2A (s25DNC2x), CEBPA, CHEK2, CTNNA1, DICER1*, DIS3L2, EGFR, EPCAM*, FH*, FLCN, GATA2, GPC3*, GREM1*, HOXB13, HRAS, KIT, MAX*, MEN1*, MET*, MITF*, MLH1*, MSH2*, MSH3*, MSH6*, MUTYH, NBN, NF1*, NF2, NTHL1, PALB2, PDGFRA, PHOX2B*, PMS2*, POLD1*, POLE, POT1, CAFEK2P, PTCH1, PTEN*, RAD50, RAD51C, RAD51D, RB1*, RECQL4*, RET, RUNX1, SDHA*, SDHAF2, SDHB, SDHC*, SDHD, SMAD4, SMARCA4, SMARCB1, SMARCE1, STK11, SUFU, TERC, TERT, BFPF107, TP53, TSC1*, TSC2, VHL, WRN*, WT1. Please refer to the report from [facility] for additional testing information. These results were discussed with Ms. Timothy Santana via telephone on 11/15/2023. Summary and Plan:   These results indicate it is unlikely that Ms. Timothy Santana has a pathogenic variant (harmful genetic mutation) in any of the genes listed above. No pathogenic variants associated with hereditary cancer syndromes were identified. The etiology Ms. Piedra's personal and family history of cancer remains genetically unexplained. Medical management and surveillance for Ms. Timothy Santana and other family members should be based on their personal and family history. All medical management decisions should be made with a physician. The limitations of the testing were discussed with Ms. Timothy Santana including the chance that a pathogenic variant in a gene other than those included in this analysis might be the cause of cancer in Ms. Timothy Santana or in relatives.      I encouraged Ms. Stephanie Singleton to share the genetic test results with her children and other relatives so that they may discuss the implications of this information with their health providers. Ms. Stephanie Singleton is also encouraged to contact me on an annual basis to learn if there have been any updates in genetic testing that would apply or if there are changes in the personal and/or family history. Please do not hesitate to contact my office if you have any questions or concerns, [phone].      Albin Jennings MS, CGC

## 2023-11-15 NOTE — PROGRESS NOTES
Luz Kimber verbally gave me permission to share all of her history and genetic testing results with her children.

## 2023-11-27 ENCOUNTER — PATIENT MESSAGE (OUTPATIENT)
Dept: FAMILY MEDICINE CLINIC | Facility: CLINIC | Age: 63
End: 2023-11-27

## 2023-11-28 NOTE — TELEPHONE ENCOUNTER
Dr. Brent Meyers is PCP now and yes, she can call us and see me or anyone in our group if she needs anything. Yes, [facility].

## 2023-12-14 ENCOUNTER — TELEPHONE (OUTPATIENT)
Dept: SURGERY | Facility: HOSPITAL | Age: 63
End: 2023-12-14

## 2023-12-14 NOTE — TELEPHONE ENCOUNTER
ASSESSMENT AND PLAN:   Trey Sauceda is a 70year old female with abnormal liver enzymes. Patient with Dx aggressive breast cancer. Had chemo last month with dehydration. Developed abn LFTS normal in the post.  Still high but better. Pt with benign fat in liver on ultrasound. Patient does not consume any alcohol and is not on any other clear culprit medications. 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 Need to rule out HBV reactivation, auto-immune hepatitis    Need to resume chemo soon. Since waiting for lab normalization is not an option, would switch chemo regimen if viral testing is negative. If HBV positive start antiviral.      1. Check viral hepatitis studies, anti-smooth muscle Ab  2. If HBV positive then start Tenofovir and resume same chemo regimen. 3. If viral testing negative, switch chemo regimen and check labs 1 week after since benefits chemo outweigh risk.

## 2024-02-12 PROBLEM — D64.9 ANEMIA, UNSPECIFIED: Status: ACTIVE | Noted: 2024-02-12

## 2024-02-12 PROBLEM — D46.20 LOW GRADE MYELODYSPLASTIC SYNDROME LESIONS (HCC): Status: ACTIVE | Noted: 2024-02-12

## 2024-02-13 ENCOUNTER — OFFICE VISIT (OUTPATIENT)
Dept: HEMATOLOGY/ONCOLOGY | Age: 64
End: 2024-02-13
Attending: INTERNAL MEDICINE
Payer: COMMERCIAL

## 2024-02-13 VITALS
RESPIRATION RATE: 16 BRPM | HEART RATE: 82 BPM | BODY MASS INDEX: 23 KG/M2 | SYSTOLIC BLOOD PRESSURE: 105 MMHG | WEIGHT: 144 LBS | OXYGEN SATURATION: 100 % | TEMPERATURE: 99 F | DIASTOLIC BLOOD PRESSURE: 82 MMHG

## 2024-02-13 DIAGNOSIS — D46.20 LOW GRADE MYELODYSPLASTIC SYNDROME LESIONS (HCC): Primary | ICD-10-CM

## 2024-02-13 LAB
ANTIBODY SCREEN: NEGATIVE
RH BLOOD TYPE: POSITIVE

## 2024-02-13 PROCEDURE — 36415 COLL VENOUS BLD VENIPUNCTURE: CPT

## 2024-02-13 PROCEDURE — 86850 RBC ANTIBODY SCREEN: CPT

## 2024-02-13 PROCEDURE — 86901 BLOOD TYPING SEROLOGIC RH(D): CPT

## 2024-02-13 PROCEDURE — 86920 COMPATIBILITY TEST SPIN: CPT

## 2024-02-13 PROCEDURE — 86900 BLOOD TYPING SEROLOGIC ABO: CPT

## 2024-02-13 PROCEDURE — 36430 TRANSFUSION BLD/BLD COMPNT: CPT

## 2024-02-13 RX ORDER — ACETAMINOPHEN 325 MG/1
650 TABLET ORAL ONCE
Status: COMPLETED | OUTPATIENT
Start: 2024-02-13 | End: 2024-02-13

## 2024-02-13 RX ORDER — ACETAMINOPHEN 325 MG/1
650 TABLET ORAL ONCE
Status: CANCELLED | OUTPATIENT
Start: 2024-02-13

## 2024-02-13 RX ADMIN — ACETAMINOPHEN 650 MG: 325 TABLET ORAL at 09:25:00

## 2024-02-13 NOTE — PROGRESS NOTES
Education Record    Learner:  Patient    Disease / Diagnosis: pt here for prbc's    Barriers / Limitations:  None    Method:  Brief focused, printed material and  reinforcement    General Topics:  Plan of care reviewed    Outcome:pt tolerated transfusion with no c/o.

## 2024-02-13 NOTE — PATIENT INSTRUCTIONS
Post Transfusion Instructions for Out-Patients    Most recipients of blood transfusions do not experience any adverse effects.  You may resume your normal activities 4 to 6 hours after your blood transfusion.  Occasionally, reactions of blood transfusions may be delayed (for several weeks).    Symptoms of a transfusion reaction can include:    Faintness  Weakness  Nausea  Vomiting  Shortness of breath  Chest pain  Back pain  Hives  Rash  Itch  Flushing  Fever  Chills  Jaundice (yellowish tint to eyes/skin)  Dark or red urine    Though these symptoms may not be related to the blood transfusions, they should be reported to your physician.  Contact both your physician and the laboratory Blood Bank (see information below) so that your symptoms can be thoroughly evaluated.      If your physician is unavailable, contact the Emergency Department.    [facility] Blood Bank:  [phone]  [facility] Emergency Department:  [phone]    [facility] Blood Bank: [phone]  [facility] Emergency Department: [phone]

## 2024-02-14 LAB
BLOOD TYPE BARCODE: 5100
UNIT VOLUME: 350 ML

## 2024-02-20 ENCOUNTER — OFFICE VISIT (OUTPATIENT)
Dept: SURGERY | Facility: CLINIC | Age: 64
End: 2024-02-20
Payer: COMMERCIAL

## 2024-02-20 VITALS
DIASTOLIC BLOOD PRESSURE: 84 MMHG | HEART RATE: 123 BPM | WEIGHT: 144.81 LBS | RESPIRATION RATE: 17 BRPM | HEIGHT: 66 IN | OXYGEN SATURATION: 99 % | BODY MASS INDEX: 23.27 KG/M2 | TEMPERATURE: 98 F | SYSTOLIC BLOOD PRESSURE: 125 MMHG

## 2024-02-20 DIAGNOSIS — C50.911 MALIGNANT NEOPLASM OF RIGHT FEMALE BREAST, UNSPECIFIED ESTROGEN RECEPTOR STATUS, UNSPECIFIED SITE OF BREAST (HCC): Primary | ICD-10-CM

## 2024-02-20 PROCEDURE — 3008F BODY MASS INDEX DOCD: CPT | Performed by: SURGERY

## 2024-02-20 PROCEDURE — 3079F DIAST BP 80-89 MM HG: CPT | Performed by: SURGERY

## 2024-02-20 PROCEDURE — 99244 OFF/OP CNSLTJ NEW/EST MOD 40: CPT | Performed by: SURGERY

## 2024-02-20 PROCEDURE — 3074F SYST BP LT 130 MM HG: CPT | Performed by: SURGERY

## 2024-02-20 NOTE — CONSULTS
New Patient Consultation    This is the first visit for this 63 year old female who presents to discuss reconstructive options following surgery for breast cancer.    History of Present Illness:   The patient is a 63 year old female who presents with a right breast cancer found by palpation.  The patient ultimately underwent biopsy which revealed invasive lobular carcinoma and the patient began chemotherapy.  She does not have breast pain. She does not have family history of breast cancer. She does not have significant past history for breast diseases or breast surgery.  The patient was seen by Dr. Espitia and has elected to undergo a bilateral skin-sparing mastectomy.  She is here today to discuss post-mastectomy reconstructive options.  She  is primarily interested in autologous reconstruction.    Past Medical History:      Past Medical History:   Diagnosis Date    Body piercing     Ears    BRCA gene mutation negative in female 2023    Negative 84 gene hereditary cancer panel, report in media tab    Breast cyst 2015    Cancer (HCC)     Right breast    History of stomach ulcers     H. Pylori    Screening for other and unspecified genitourinary condition     Visual impairment     Contacts/Glasses    Wears glasses          Past Surgical History:  Past Surgical History:   Procedure Laterality Date    BENIGN BIOPSY LEFT      stereotactic          x4    CHOLECYSTECTOMY  2018    COLONOSCOPY  2010    complete, repeat 5 years    COLONOSCOPY      CYST ASPIRATION LEFT  8 years ago?    EGD      OTHER SURGICAL HISTORY  2012    MRI guided core breast biopsy left breast 3:00 and at the central region by Dr. Brooks at [facility]    UPPER GI ENDOSCOPY,EXAM  2011    EGD         Medications:    No outpatient medications have been marked as taking for the 24 encounter (Appointment) with Jeff Sena MD.         Allergies:    No Known Allergies      Family  History:   Family History   Problem Relation Age of Onset    Heart Disorder Father     Other (Pulmonary fibrosis) Father     Other (Leukemia) Mother     Colon Cancer Son         \"precancerous anal\"    Cancer Son         skin    Diabetes Brother     Colon Polyps Brother     Colon Cancer Brother         pre-cancer cells removed    Heart Disorder Brother     Other (Benign tubular adenoma of the large intestine) Brother     Diabetes Brother     Diabetes Other     Colon Cancer Son         pre cancer anal removed         Social History:  History   Alcohol Use    Yes     Comment: Occasional glass of wine or beer if dinner out       History   Smoking Status    Never   Smokeless Tobacco    Never       History   Drug Use Unknown           Review of Systems:    General:   The patient denies, fever, chills, night sweats, fatigue, generalized weakness, change in appetite, weight loss, or weight gain.    Endocrine:  There is no history of poor/slow wound healing, weight loss/gain, fertility or hormone problems, cold intolerance, heat intolerance, excessive thirst, or thyroid disease.    Allergic/Immunologic:  There is no history of hives, hay fever, angioedema or anaphylaxis.    HEENT:  The patient denies ear pain, ear drainage, hearing loss, change in vision, double vision, cataracts, glaucoma, nasal congestion, nosebleed, hoarseness, sore throat, or swollen glands. + Wears contacts/glasses    Respiratory:  The patient denies shortness of breath, cough, bloody cough, phlegm, asthma, or wheezing.    Cardiovascular:   The patient denies chest pain/pressure, palpitations, irregular heartbeat, high blood pressure, stroke, or leg swelling.    Breasts:  The patient denies breast masses, pain, change in the breast skin, skin dimpling, nipple discharge, or rash.    Gastrointestinal:  There is no history of difficulty or pain with swallowing, reflux symptoms, nausea, vomiting, dark/bloody stools, diarrhea, constipation, change in bowel  habits, or abdominal pain.    Genitourinary:  The patient denies frequent urination, needing to get up at night to urinate, urinary hesitancy or retaining urine, painful urination, urinary incontinence, decreased urine stream, blood in urine, or vaginal/penile discharge.    Skin:  Then patient denies rash, itching, skin lesions, dry skin, change in skin color or change in moles, sunburn with blistering.    Hematologic/Lymphatic:  The patient denies easily bruising or bleeding, persistent swollen glands or lymph nodes, bleeding disorders, blood clots, or pulmonary embolism.    Gynecologic:  The patient denies irregular menses, pelvic pain, pain with intercourse, painful menses, or pregnancy.    Musculoskeletal:  The patient denies muscle aches/pain, joint pain, stiff joints, neck pain, back pain or bone pain.    Neurologic:  There is no history of migraines or severe headaches, seizure/epilepsy, speech problems, coordination problems, trembling/tremors, fainting/black outs, dizziness, memory problems, loss of sensation/numbness, problems walking, weakness, tingling or burning in hands/feet.     Psychiatric:  There is no history of abusive relationship, bipolar disorder, sleep disturbance, anxiety, depression or feeling of despair.       Physical Exam:  Vitals:    02/20/24 1100   BP: 135/81   Pulse: (!) 140   Resp: 17   Temp: 97.8 °F (36.6 °C)     Body mass index is 23.37 kg/m².      The patient is awake, alert, and oriented.  She is well-nourished, well-developed woman who appears her stated age. Her speech patterns and movements are normal. Her affect is appropriate.    HEENT: The head is normocephalic. The neck is supple. The thyroid is not enlarged and is without palpable masses.  The trachea is in the midline. Conjunctiva are clear, non-icteric.    Right breast: Grade 2 ptosis is noted.  Striae are noted.  Measurements: sternal notch to nipple 26cm, nipple to inframammary fold 9cm, base diameter 15cm.  The skin,  nipple, and areola appear normal.  There is no nipple retraction or discharge.  There are no dominant masses in the breast.     Left breast:  Grade 2 ptosis is noted.  Striae are noted.  Measurements:  sternal notch to nipple 29cm, nipple to inframammary fold 15.5cm, base diameter 10cm.  The skin, nipple, and areola appear normal.  There is no nipple retraction or discharge.  There are no dominant masses in the breast.      Abdomen:  A moderate abdominal pannus with striae is noted.  A well-healed low transverse as well as lower midline scar is noted.  No palpable hernia is detected.      Lymph Nodes:  There is no cervical, supraclavicular, or axillary lymphadenopathy appreciated.    Back: There is no vertebral column tenderness.    Skin: The skin appears normal. There are no suspicious appearing rashes or lesions.    Extremities: The extremities are without deformity, cyanosis or edema.    Impression:   The patient is a 63 year old female who presents with a right breast cancer awaiting bilateral skin-sparing mastectomy.     Discussion and Plan:  We reviewed the options for post-mastectomy reconstruction and discussed the risks/benefits of timing (immediate versus delayed) and technique (implant-based versus autologous).  Given the patient's desires, the majority of our discussion was focused on autologous reconstruction.  Given the patient's indeterminate need for postmastectomy radiation therapy, we discussed a delayed immediate approach with placement of a prepectoral tissue expander at the time of mastectomy followed by secondary flap reconstruction after the completion of any potential adjuvant therapies.  Given the patient's previous abdominal surgery, we discussed the need for preoperative CT angiogram to confirm the patency of the deep inferior gastric system and suitable perforators.    Specifically, the nature and technique of tissue expander reconstruction was reviewed with the patient. We discussed the  prepectoral placement of the tissue expander, use of acellular matrix, and placement of drains.  We discussed that use of acellular dermal matrix in  breast reconstruction is considered an \"off label\" use.  The expansion process, as well as the need for a secondary procedure for placement of a permanent implant, were reviewed.  Representative illustrations and photographs were demonstrated to the patient. The risks of surgery including but not limited to bleeding, infection, scarring, delayed wound healing, seroma, asymmetry, implant infection/extrusion requiring removal, injury to adjacent structures, capsular contracture, ALCL, breast implant associated illness, need for implant exchange, and need for further surgery were reviewed. The expected post-operative course was discussed including the need for activity limitation, drains, and suture removal.  The recommended FDA surveillance protocol for silicone implants was reviewed.  Finally, we reviewed the impact of post-mastectomy radiation therapy on implant-based reconstruction (should it be deemed necessary based on the final pathology results), including increased risk of reconstructive loss and capsular contracture.     In addition, the nature and technique of breast reconstruction with abdominal free flap was reviewed with the patient. We reviewed the variations of abdominal free flap reconstruction including ASHLEY flap, SIEA flap, muscle-sparing free TRAM flap, and free TRAM flap. Representative illustrations and photographs were demonstrated to the patient. The risks of surgery including but not limited to bleeding, infection, scarring, seroma, delayed wound healing, partial/total flap loss, fat necrosis, asymmetry, injury to adjacent structures, abdominal hernia/weakness/bulge, need for further surgery, and venous thromboembolism were reviewed. The expected post-operative course was discussed including the need for activity limitation, drains, and suture  removal.     Finally, we discussed the possible need for revisions as well as the process of nipple areolar reconstruction.    Multiple questions were answered to the patient and daughter-in-law's satisfaction. No guarantees as to outcome were offered. The patient expresses understanding wish to proceed with immediate tissue expander reconstruction. A total of 60 minutes was spent reviewing the patient's history and diagnostic testing, examining the patient, reviewing reconstructive options, and coordinating the patient's care.

## 2024-02-20 NOTE — PATIENT INSTRUCTIONS
Surgeon:         Dr. Jeff Sean                                        Tel:         [phone]                                  Fax:        [phone]    Surgery/Procedure: Immediate breast reconstruction with placement of bilateral breast tissue expanders and acellular dermal matrix. 2.5 hours, general anesthesia, outpatient. Joint with Dr. Espitia. Please request pre-op pec block per anesthesia.     Dx Code: N63.20    Hospital:  [facility]: [address] [place] [address] [id number] [phone]    1. Someone will need to drive you to and from the hospital if your procedure is outpatient.    2.Do not drink alcohol or smoke 24 hours prior to your procedure.    3. Bring a picture ID and your insurance card.    4. You will be contacted by the hospital the day before to confirm the procedure time and location.     5. Do not take any herbal supplements or blood thinners at least one week before your procedure/surgery. This includes NSAID's (aspirin, baby aspirin, Motrin, Ibuprofen, Aleve, Advil, Naproxen, etc), Plavix, fish oil, vitamin E, turmeric, CoQ10, or green tea supplements, etc. *TYLENOL or acetaminophen is ok to take*    6. PRE-OPERATIVE TESTING: History and physical with medical clearance is REQUIRED within 30 days of the surgery date and is mandatory per Dr. Sena. *If this is not done, your surgery will be postponed*  MEDICAL CLEARANCE WITH DR. Petit  CBC  CMP  EKG (within 90 days)  *Will need oncology clearance*  *Will need cardiac clearance*    7. If you take Coumadin, Plavix, Xarelto, or Eliquis, please contact your prescribing physician for special instructions on how long to hold. If you take insulin, contact your primary care physician for special instructions.     8. Please inform us if you start or change any medications at least one week before surgery (ex: blood thinners, weight loss medications, diabetic medications, herbal supplements, etc)    9. Does patient have  diagnosis of sleep apnea?    [   ] Yes     [  X ]  No    Consent obtained  Photos taken on 2/202/2023

## 2024-02-22 ENCOUNTER — TELEPHONE (OUTPATIENT)
Dept: SURGERY | Facility: CLINIC | Age: 64
End: 2024-02-22

## 2024-02-22 NOTE — TELEPHONE ENCOUNTER
Called Dr. Espitia's office spoke to surgery scheduler Nicci and offered some dates to do a joint case.  Nicci will talk to Dr. Espitia and call me back

## 2024-02-27 DIAGNOSIS — C50.911 MALIGNANT NEOPLASM OF RIGHT FEMALE BREAST, UNSPECIFIED ESTROGEN RECEPTOR STATUS, UNSPECIFIED SITE OF BREAST (HCC): Primary | ICD-10-CM

## 2024-02-29 ENCOUNTER — TELEPHONE (OUTPATIENT)
Dept: FAMILY MEDICINE CLINIC | Facility: CLINIC | Age: 64
End: 2024-02-29

## 2024-02-29 ENCOUNTER — PATIENT MESSAGE (OUTPATIENT)
Dept: FAMILY MEDICINE CLINIC | Facility: CLINIC | Age: 64
End: 2024-02-29

## 2024-02-29 DIAGNOSIS — Z01.818 PRE-OP EXAM: Primary | ICD-10-CM

## 2024-02-29 NOTE — TELEPHONE ENCOUNTER
Pre-Op paperwork received by fax.    DOS:  6/5/24  Surgeon: Dr. Sena w/ Dr. Espitia  Procedure: Immediate breast reconstruction with placement of bilateral breast tissue expanders and acellular dermal matrix.       On day of pre-op will provide:  H&P; Medical Clearance; results of EKG; CBC; & CMP  And fax results to: [phone]

## 2024-03-01 NOTE — TELEPHONE ENCOUNTER
[facility], will leave as scheduled.

## 2024-03-01 NOTE — TELEPHONE ENCOUNTER
From: Daria Piedra  To: Stephanie Dressler  Sent: 2/29/2024 7:51 PM CST  Subject: Surgery Clearance    Nathalia,  I have an appt on 5/20 with you for surgery clearance on 6/5. Should I make the appt with Dr. Petit or can you clear me and order the bloodwork?  Daria

## 2024-03-26 ENCOUNTER — APPOINTMENT (OUTPATIENT)
Dept: GENERAL RADIOLOGY | Facility: HOSPITAL | Age: 64
End: 2024-03-26
Attending: EMERGENCY MEDICINE
Payer: COMMERCIAL

## 2024-03-26 ENCOUNTER — HOSPITAL ENCOUNTER (INPATIENT)
Facility: HOSPITAL | Age: 64
LOS: 1 days | Discharge: HOME | End: 2024-03-27
Attending: EMERGENCY MEDICINE | Admitting: HOSPITALIST
Payer: COMMERCIAL

## 2024-03-26 DIAGNOSIS — R55 SYNCOPE, CARDIOGENIC: Primary | ICD-10-CM

## 2024-03-26 LAB
ALBUMIN SERPL-MCNC: 4.8 G/DL (ref 3.2–4.8)
ALBUMIN/GLOB SERPL: 1.8 {RATIO} (ref 1–2)
ALP LIVER SERPL-CCNC: 77 U/L
ALT SERPL-CCNC: 43 U/L
ANION GAP SERPL CALC-SCNC: 16 MMOL/L (ref 0–18)
AST SERPL-CCNC: 41 U/L (ref ?–34)
BASOPHILS # BLD AUTO: 0.03 X10(3) UL (ref 0–0.2)
BASOPHILS NFR BLD AUTO: 0.6 %
BILIRUB SERPL-MCNC: 0.7 MG/DL (ref 0.2–1.1)
BNP SERPL-MCNC: 6 PG/ML
BUN BLD-MCNC: 13 MG/DL (ref 9–23)
BUN/CREAT SERPL: 12.6 (ref 10–20)
CALCIUM BLD-MCNC: 9.8 MG/DL (ref 8.7–10.4)
CHLORIDE SERPL-SCNC: 105 MMOL/L (ref 98–112)
CHOLEST SERPL-MCNC: 160 MG/DL (ref ?–200)
CO2 SERPL-SCNC: 21 MMOL/L (ref 21–32)
CREAT BLD-MCNC: 1.03 MG/DL
D DIMER PPP FEU-MCNC: 0.53 UG/ML FEU (ref ?–0.65)
DEPRECATED RDW RBC AUTO: 58.3 FL (ref 35.1–46.3)
EGFRCR SERPLBLD CKD-EPI 2021: 60 ML/MIN/1.73M2 (ref 60–?)
EOSINOPHIL # BLD AUTO: 0.03 X10(3) UL (ref 0–0.7)
EOSINOPHIL NFR BLD AUTO: 0.6 %
ERYTHROCYTE [DISTWIDTH] IN BLOOD BY AUTOMATED COUNT: 15.6 % (ref 11–15)
GLOBULIN PLAS-MCNC: 2.6 G/DL (ref 2.8–4.4)
GLUCOSE BLD-MCNC: 133 MG/DL (ref 70–99)
GLUCOSE BLDC GLUCOMTR-MCNC: 145 MG/DL (ref 70–99)
HCT VFR BLD AUTO: 27.8 %
HDLC SERPL-MCNC: 39 MG/DL (ref 40–59)
HGB BLD-MCNC: 9 G/DL
IMM GRANULOCYTES # BLD AUTO: 0.03 X10(3) UL (ref 0–1)
IMM GRANULOCYTES NFR BLD: 0.6 %
LDLC SERPL CALC-MCNC: 89 MG/DL (ref ?–100)
LYMPHOCYTES # BLD AUTO: 0.97 X10(3) UL (ref 1–4)
LYMPHOCYTES NFR BLD AUTO: 19.1 %
MAGNESIUM SERPL-MCNC: 1.7 MG/DL (ref 1.6–2.6)
MCH RBC QN AUTO: 33.5 PG (ref 26–34)
MCHC RBC AUTO-ENTMCNC: 32.4 G/DL (ref 31–37)
MCV RBC AUTO: 103.3 FL
MONOCYTES # BLD AUTO: 0.28 X10(3) UL (ref 0.1–1)
MONOCYTES NFR BLD AUTO: 5.5 %
NEUTROPHILS # BLD AUTO: 3.75 X10 (3) UL (ref 1.5–7.7)
NEUTROPHILS # BLD AUTO: 3.75 X10(3) UL (ref 1.5–7.7)
NEUTROPHILS NFR BLD AUTO: 73.6 %
NONHDLC SERPL-MCNC: 121 MG/DL (ref ?–130)
OSMOLALITY SERPL CALC.SUM OF ELEC: 296 MOSM/KG (ref 275–295)
PLATELET # BLD AUTO: 327 10(3)UL (ref 150–450)
POTASSIUM SERPL-SCNC: 3.3 MMOL/L (ref 3.5–5.1)
PROT SERPL-MCNC: 7.4 G/DL (ref 5.7–8.2)
RBC # BLD AUTO: 2.69 X10(6)UL
SODIUM SERPL-SCNC: 142 MMOL/L (ref 136–145)
TRIGL SERPL-MCNC: 188 MG/DL (ref 30–149)
TROPONIN I SERPL HS-MCNC: 142 NG/L
TROPONIN I SERPL HS-MCNC: 169 NG/L
VLDLC SERPL CALC-MCNC: 30 MG/DL (ref 0–30)
WBC # BLD AUTO: 5.1 X10(3) UL (ref 4–11)

## 2024-03-26 PROCEDURE — 85379 FIBRIN DEGRADATION QUANT: CPT | Performed by: EMERGENCY MEDICINE

## 2024-03-26 PROCEDURE — 85025 COMPLETE CBC W/AUTO DIFF WBC: CPT | Performed by: EMERGENCY MEDICINE

## 2024-03-26 PROCEDURE — 84484 ASSAY OF TROPONIN QUANT: CPT | Performed by: EMERGENCY MEDICINE

## 2024-03-26 PROCEDURE — 80053 COMPREHEN METABOLIC PANEL: CPT | Performed by: EMERGENCY MEDICINE

## 2024-03-26 PROCEDURE — 80061 LIPID PANEL: CPT | Performed by: EMERGENCY MEDICINE

## 2024-03-26 PROCEDURE — 83735 ASSAY OF MAGNESIUM: CPT | Performed by: EMERGENCY MEDICINE

## 2024-03-26 PROCEDURE — 82962 GLUCOSE BLOOD TEST: CPT

## 2024-03-26 PROCEDURE — 83880 ASSAY OF NATRIURETIC PEPTIDE: CPT | Performed by: EMERGENCY MEDICINE

## 2024-03-26 PROCEDURE — 93005 ELECTROCARDIOGRAM TRACING: CPT

## 2024-03-26 PROCEDURE — 99285 EMERGENCY DEPT VISIT HI MDM: CPT

## 2024-03-26 PROCEDURE — 71045 X-RAY EXAM CHEST 1 VIEW: CPT | Performed by: EMERGENCY MEDICINE

## 2024-03-26 PROCEDURE — 93010 ELECTROCARDIOGRAM REPORT: CPT

## 2024-03-26 PROCEDURE — 36415 COLL VENOUS BLD VENIPUNCTURE: CPT

## 2024-03-26 PROCEDURE — 87493 C DIFF AMPLIFIED PROBE: CPT | Performed by: STUDENT IN AN ORGANIZED HEALTH CARE EDUCATION/TRAINING PROGRAM

## 2024-03-26 RX ORDER — DILTIAZEM HYDROCHLORIDE 5 MG/ML
5 INJECTION INTRAVENOUS SEE ADMIN INSTRUCTIONS
Status: DISCONTINUED | OUTPATIENT
Start: 2024-03-26 | End: 2024-03-27

## 2024-03-26 RX ORDER — POTASSIUM CHLORIDE 1.5 G/1.58G
40 POWDER, FOR SOLUTION ORAL EVERY 4 HOURS
Status: COMPLETED | OUTPATIENT
Start: 2024-03-26 | End: 2024-03-26

## 2024-03-26 RX ORDER — NITROGLYCERIN 0.4 MG/1
0.4 TABLET SUBLINGUAL ONCE
Status: COMPLETED | OUTPATIENT
Start: 2024-03-26 | End: 2024-03-27

## 2024-03-26 RX ORDER — METOPROLOL TARTRATE 50 MG/1
50 TABLET, FILM COATED ORAL ONCE AS NEEDED
Status: COMPLETED | OUTPATIENT
Start: 2024-03-26 | End: 2024-03-27

## 2024-03-26 RX ORDER — ENOXAPARIN SODIUM 100 MG/ML
40 INJECTION SUBCUTANEOUS DAILY
Status: DISCONTINUED | OUTPATIENT
Start: 2024-03-26 | End: 2024-03-27

## 2024-03-26 RX ORDER — POLYETHYLENE GLYCOL 3350 17 G/17G
17 POWDER, FOR SOLUTION ORAL DAILY PRN
Status: DISCONTINUED | OUTPATIENT
Start: 2024-03-26 | End: 2024-03-27

## 2024-03-26 RX ORDER — BISACODYL 10 MG
10 SUPPOSITORY, RECTAL RECTAL
Status: DISCONTINUED | OUTPATIENT
Start: 2024-03-26 | End: 2024-03-27

## 2024-03-26 RX ORDER — ONDANSETRON 2 MG/ML
4 INJECTION INTRAMUSCULAR; INTRAVENOUS EVERY 6 HOURS PRN
Status: DISCONTINUED | OUTPATIENT
Start: 2024-03-26 | End: 2024-03-27

## 2024-03-26 RX ORDER — METOPROLOL TARTRATE 1 MG/ML
5 INJECTION, SOLUTION INTRAVENOUS SEE ADMIN INSTRUCTIONS
Status: DISCONTINUED | OUTPATIENT
Start: 2024-03-26 | End: 2024-03-27

## 2024-03-26 RX ORDER — SENNOSIDES 8.6 MG
17.2 TABLET ORAL NIGHTLY PRN
Status: DISCONTINUED | OUTPATIENT
Start: 2024-03-26 | End: 2024-03-27

## 2024-03-26 RX ORDER — METOPROLOL TARTRATE 50 MG/1
50 TABLET, FILM COATED ORAL ONCE AS NEEDED
Status: DISCONTINUED | OUTPATIENT
Start: 2024-03-27 | End: 2024-03-27

## 2024-03-26 RX ORDER — METOPROLOL TARTRATE 50 MG/1
50 TABLET, FILM COATED ORAL ONCE AS NEEDED
Status: ACTIVE | OUTPATIENT
Start: 2024-03-26 | End: 2024-03-26

## 2024-03-26 RX ORDER — METOPROLOL TARTRATE 50 MG/1
100 TABLET, FILM COATED ORAL ONCE AS NEEDED
Status: COMPLETED | OUTPATIENT
Start: 2024-03-26 | End: 2024-03-27

## 2024-03-26 RX ORDER — METOPROLOL TARTRATE 50 MG/1
50 TABLET, FILM COATED ORAL ONCE
Status: COMPLETED | OUTPATIENT
Start: 2024-03-26 | End: 2024-03-26

## 2024-03-26 RX ORDER — METOPROLOL TARTRATE 50 MG/1
100 TABLET, FILM COATED ORAL ONCE AS NEEDED
Status: ACTIVE | OUTPATIENT
Start: 2024-03-26 | End: 2024-03-26

## 2024-03-26 RX ORDER — METOPROLOL TARTRATE 50 MG/1
50 TABLET, FILM COATED ORAL ONCE
Status: COMPLETED | OUTPATIENT
Start: 2024-03-27 | End: 2024-03-27

## 2024-03-26 RX ORDER — LOPERAMIDE HYDROCHLORIDE 2 MG/1
2 CAPSULE ORAL 4 TIMES DAILY PRN
COMMUNITY

## 2024-03-26 RX ORDER — ALPRAZOLAM 0.25 MG/1
0.25 TABLET ORAL ONCE AS NEEDED
Status: ACTIVE | OUTPATIENT
Start: 2024-03-26 | End: 2024-03-26

## 2024-03-26 RX ORDER — ENEMA 19; 7 G/133ML; G/133ML
1 ENEMA RECTAL ONCE AS NEEDED
Status: DISCONTINUED | OUTPATIENT
Start: 2024-03-26 | End: 2024-03-27

## 2024-03-26 RX ORDER — ACETAMINOPHEN 500 MG
500 TABLET ORAL EVERY 4 HOURS PRN
Status: DISCONTINUED | OUTPATIENT
Start: 2024-03-26 | End: 2024-03-27

## 2024-03-26 RX ORDER — METOPROLOL TARTRATE 50 MG/1
100 TABLET, FILM COATED ORAL ONCE
Status: COMPLETED | OUTPATIENT
Start: 2024-03-27 | End: 2024-03-27

## 2024-03-26 RX ORDER — MAGNESIUM OXIDE 400 MG/1
400 TABLET ORAL ONCE
Status: COMPLETED | OUTPATIENT
Start: 2024-03-26 | End: 2024-03-26

## 2024-03-26 RX ORDER — METOPROLOL TARTRATE 50 MG/1
100 TABLET, FILM COATED ORAL ONCE
Status: COMPLETED | OUTPATIENT
Start: 2024-03-26 | End: 2024-03-26

## 2024-03-26 RX ORDER — METOCLOPRAMIDE HYDROCHLORIDE 5 MG/ML
10 INJECTION INTRAMUSCULAR; INTRAVENOUS EVERY 8 HOURS PRN
Status: DISCONTINUED | OUTPATIENT
Start: 2024-03-26 | End: 2024-03-27

## 2024-03-26 RX ORDER — METOPROLOL TARTRATE 50 MG/1
100 TABLET, FILM COATED ORAL ONCE AS NEEDED
Status: DISCONTINUED | OUTPATIENT
Start: 2024-03-27 | End: 2024-03-27

## 2024-03-26 NOTE — H&P
[facility] HOSPITALIST HISTORY AND PHYSICAL     CC:   Chief Complaint   Patient presents with    Syncope        PCP: Maegan Petit MD    History of Present Illness:   Patient is a 63 year old female with PMH sig for breast cancer on neoadjuvant chemo - changed regimen due to LFT elevation also noted tachycardia-during chemo sessions -some attributed to acute anemia (hg to 7) but persisted despite transfusions. she has had echos on chemo regimen due to cardiotoxicity risk with normal EFs and noted on heart monitor w episodes NSVT was sent to EP and due to have an ischemic eval w a nuc stress test. Today while out playing golf had an episode of LOC for several seconds- + associated incontinence- here noted w K 3.3 Mg 1.7 and trop up. Currently is quite upset that she has to stay in the hospital but otherwise asx.       Past Medical History:   Diagnosis Date    Body piercing     Ears    BRCA gene mutation negative in female 2023    Negative 84 gene hereditary cancer panel, report in media tab    Breast cyst 2015    Cancer (HCC)     Right breast    History of stomach ulcers     H. Pylori    Screening for other and unspecified genitourinary condition     Visual impairment     Contacts/Glasses    Wears glasses       Past Surgical History:   Procedure Laterality Date    BENIGN BIOPSY LEFT      stereotactic          x4    CHOLECYSTECTOMY  2018    COLONOSCOPY  2010    complete, repeat 5 years    COLONOSCOPY      CYST ASPIRATION LEFT  8 years ago?    EGD      OTHER SURGICAL HISTORY  2012    MRI guided core breast biopsy left breast 3:00 and at the central region by Dr. Brooks at [facility]    UPPER GI ENDOSCOPY,EXAM  2011    EGD        ALL:  No Known Allergies         Social History     Tobacco Use    Smoking status: Never    Smokeless tobacco: Never   Substance Use Topics    Alcohol use: Not Currently     Comment: Occasional glass of wine or beer if dinner out        Fam  Hx  Family History   Problem Relation Age of Onset    Heart Disorder Father     Other (Pulmonary fibrosis) Father     Other (Leukemia) Mother     Colon Cancer Son         \"precancerous anal\"    Cancer Son         skin    Diabetes Brother     Colon Polyps Brother     Colon Cancer Brother         pre-cancer cells removed    Heart Disorder Brother     Other (Benign tubular adenoma of the large intestine) Brother     Diabetes Brother     Diabetes Other     Colon Cancer Son         pre cancer anal removed       Review of Systems  10 point Comprehensive ROS reviewed and negative except for what's stated above.     OBJECTIVE:  /68   Pulse 92   Temp 98.2 °F (36.8 °C) (Oral)   Resp 20   Ht 5' 6\" (1.676 m)   Wt 143 lb (64.9 kg)   LMP 12/21/2008 (Approximate)   SpO2 99%   BMI 23.08 kg/m²     GEN: NAD, AAO  NECK: supple, no LAD  HEENT- sclera anti-icteric, OP- MMM  CV: tachy, regular  LUNGS: CTAB, normal resp effort  ABL soft, NT/ND, NABS, no HSC  EXT: no LE edema b/l , DP pulses 2+ b/l  Neuro: sensation intact, no focal deficits  SKIN- no rashes, no lesion  PSYCH- normal mentation, ++ anxious affect      LABS:   Lab Results   Component Value Date    WBC 5.1 03/26/2024    HGB 9.0 03/26/2024    HCT 27.8 03/26/2024    .0 03/26/2024    CREATSERUM 1.03 03/26/2024    BUN 13 03/26/2024     03/26/2024    K 3.3 03/26/2024     03/26/2024    CO2 21.0 03/26/2024     03/26/2024    CA 9.8 03/26/2024    ALB 4.8 03/26/2024    ALKPHO 77 03/26/2024    BILT 0.7 03/26/2024    TP 7.4 03/26/2024    AST 41 03/26/2024    ALT 43 03/26/2024    DDIMER 0.53 03/26/2024    MG 1.7 03/26/2024    PGLU 145 03/26/2024     EKG personally reviewed    Radiology: XR CHEST AP PORTABLE  (CPT=71045)    Result Date: 3/26/2024         PROCEDURE: XR AP PORTABLE  (CPT=71045) TIME: 1505  COMPARISON: None.  INDICATIONS: Heart palpitations and syncope episode today.  TECHNIQUE:   Single view.   Findings and impression:  Normal heart  size.  No edema.  Evelin catheter in the lower SVC.  Normal lung volumes.  Linear scar or atelectasis upper right lung.  Lungs otherwise clear.  Normal pleura.  No free air     Dictated by (CST): Antonio Patel MD on 3/26/2024 at 3:33 PM     Finalized by (CST): Antonio Patel MD on 3/26/2024 at 3:34 PM               ASSESSMENT / PLAN:  63YOF undergoing neoadjuvant breast cancer noted w recent tachcyardia and episodes asx NSVT (on zio monitor) here sp syncope     Syncope  NSVT episodes  Hypokalemia fm GI losses- diarrhea  Hypomagnesemia  Breast cancer on neoadjuant chemo plan for bl mastectomy this summer  Troponin elevation      plan  - echo q 3 mo due in apr 2024- ordered for here w strain protocol- prior have all had normal LVEF  - brenda cards recs- due for ischemic eval- getting a CTA coronary here  - TSH ok, EKG ok  - K > 4 Mg > 2- repletion protocol ordered  - BB ordered  - tele monitoring  - anxiolytics prn    Outpatient records or previous hospital records reviewed as detailed above.    FLIP hospitalist to continue to follow patient while in house      [facility] Hospitalist  Pager [phone]    3/26/2024  4:31 PM

## 2024-03-26 NOTE — ED PROVIDER NOTES
Patient Seen in: [facility] Emergency Department      History     Chief Complaint   Patient presents with    Syncope     Stated Complaint: syncope    Subjective:   HPI    63-year-old female with history of breast cancer currently on neoadjuvant chemotherapy prior to planned mastectomy this summer, recent episodes of tachycardia and nonsustained V. tach on home monitor presents for evaluation after syncopal episode.  Patient was playing mini golf with family, started to feel palpitations and looked at her Apple Watch, noted heart rate of 140.  She sat down and when she was ready to stand up stood up and started walking, she reports she remembers is being on the floor.  Someone tried to help her up and she fell down again.  She had bowel incontinence.  She now reports feeling back to baseline, no current complaints, no chest pain or shortness of breath.    Objective:   Past Medical History:   Diagnosis Date    Body piercing     Ears    BRCA gene mutation negative in female 2023    Negative 84 gene hereditary cancer panel, report in media tab    Breast cyst 2015    Cancer (HCC)     Right breast    History of stomach ulcers     H. Pylori    Screening for other and unspecified genitourinary condition     Visual impairment     Contacts/Glasses    Wears glasses               Past Surgical History:   Procedure Laterality Date    BENIGN BIOPSY LEFT      stereotactic          x4    CHOLECYSTECTOMY  2018    COLONOSCOPY  2010    complete, repeat 5 years    COLONOSCOPY      CYST ASPIRATION LEFT  8 years ago?    EGD      OTHER SURGICAL HISTORY  2012    MRI guided core breast biopsy left breast 3:00 and at the central region by Dr. Brooks at [facility]    UPPER GI ENDOSCOPY,EXAM  2011    EGD                Social History     Socioeconomic History    Marital status:    Tobacco Use    Smoking status: Never    Smokeless tobacco: Never   Vaping Use    Vaping Use:  Never used   Substance and Sexual Activity    Alcohol use: Not Currently     Comment: Occasional glass of wine or beer if dinner out    Drug use: Never   Other Topics Concern    Caffeine Concern Yes     Comment: 1 q day    Stress Concern No    Weight Concern No    Special Diet No    Exercise Yes     Comment: 30 minutes on threadmill daily    Seat Belt Yes              Review of Systems    Positive for stated complaint: syncope  Other systems are as noted in HPI.  Constitutional and vital signs reviewed.      All other systems reviewed and negative except as noted above.    Physical Exam     ED Triage Vitals [03/26/24 1451]   /86   Pulse 117   Resp 19   Temp 98.2 °F (36.8 °C)   Temp src Oral   SpO2 99 %   O2 Device None (Room air)       Current:/68   Pulse 92   Temp 98.2 °F (36.8 °C) (Oral)   Resp 20   Ht 167.6 cm (5' 6\")   Wt 64.9 kg   LMP 12/21/2008 (Approximate)   SpO2 99%   BMI 23.08 kg/m²         Physical Exam  Vitals and nursing note reviewed.   Constitutional:       General: She is not in acute distress.     Appearance: She is well-developed.   HENT:      Head: Normocephalic and atraumatic.   Eyes:      Conjunctiva/sclera: Conjunctivae normal.   Cardiovascular:      Rate and Rhythm: Normal rate and regular rhythm.      Heart sounds: Normal heart sounds.   Pulmonary:      Effort: Pulmonary effort is normal. No respiratory distress.      Breath sounds: Normal breath sounds.   Abdominal:      General: Bowel sounds are normal. There is no distension.      Palpations: Abdomen is soft.      Tenderness: There is no abdominal tenderness. There is no guarding or rebound.   Musculoskeletal:         General: Normal range of motion.      Cervical back: Normal range of motion and neck supple.   Skin:     General: Skin is warm and dry.      Findings: No rash.   Neurological:      General: No focal deficit present.      Mental Status: She is alert and oriented to person, place, and time.               ED  Course     Labs Reviewed   COMP METABOLIC PANEL (14) - Abnormal; Notable for the following components:       Result Value    Glucose 133 (*)     Potassium 3.3 (*)     Creatinine 1.03 (*)     Calculated Osmolality 296 (*)     AST 41 (*)     Globulin  2.6 (*)     All other components within normal limits   TROPONIN I HIGH SENSITIVITY - Abnormal; Notable for the following components:    Troponin I (High Sensitivity) 142 (*)     All other components within normal limits   LIPID PANEL - Abnormal; Notable for the following components:    HDL Cholesterol 39 (*)     Triglycerides 188 (*)     All other components within normal limits   POCT GLUCOSE - Abnormal; Notable for the following components:    POC Glucose  145 (*)     All other components within normal limits   CBC W/ DIFFERENTIAL - Abnormal; Notable for the following components:    RBC 2.69 (*)     HGB 9.0 (*)     HCT 27.8 (*)     .3 (*)     RDW-SD 58.3 (*)     RDW 15.6 (*)     Lymphocyte Absolute 0.97 (*)     All other components within normal limits   BNP (B TYPE NATRIURETIC PEPTIDE) - Normal   D-DIMER - Normal   MAGNESIUM - Normal   CBC WITH DIFFERENTIAL WITH PLATELET    Narrative:     The following orders were created for panel order CBC With Differential With Platelet.  Procedure                               Abnormality         Status                     ---------                               -----------         ------                     CBC W/ DIFFERENTIAL[[id number]]          Abnormal            Final result                 Please view results for these tests on the individual orders.   RAINBOW DRAW BLUE     EKG    Rate, intervals and axes as noted on EKG Report.  Rate: 108  Rhythm: Sinus Rhythm  Reading: Sinus tachycardia with normal intervals, no STEMI            Imaging Results Available and Reviewed while in ED:   XR CHEST AP PORTABLE  (CPT=71045)   Final Result               =====   PROCEDURE: XR AP PORTABLE  (CPT=71045)   TIME: 4648        COMPARISON: None.       INDICATIONS: Heart palpitations and syncope episode today.       TECHNIQUE:   Single view.         Findings and impression:  Normal heart size.  No edema.  Evelin catheter in    the lower SVC.  Normal lung volumes.  Linear scar or atelectasis upper    right lung.  Lungs otherwise clear.  Normal pleura.  No free air                 Dictated by (CST): Antonio Patel MD on 3/26/2024 at 3:33 PM        Finalized by (CST): Antonio Patel MD on 3/26/2024 at 3:34 PM                 Vitals:    03/26/24 1451 03/26/24 1500 03/26/24 1600   BP: 155/86 131/83 116/68   Pulse: 117 102 92   Resp: 19 23 20   Temp: 98.2 °F (36.8 °C)     TempSrc: Oral     SpO2: 99% 98% 99%   Weight: 64.9 kg     Height: 167.6 cm (5' 6\")       *I personally reviewed and interpreted all ED vitals.           MDM        Admission disposition: 3/26/2024  4:12 PM           Medical Decision Making  Differential diagnosis includes but is not limited to arrhythmia, electrolyte abnormality, anemia, cardiomyopathy, ACS    Well-appearing patient, noted to have mild hypokalemia with normal magnesium, given potassium chloride.  Troponin slightly elevated.  Given history and concern for syncope related to V. tach admitted for further evaluation and monitoring.  Discussed with and admitted to Dr. Buchanan.  Discussed with and seen in the emergency department by Dr. Stearns.    Problems Addressed:  Syncope, cardiogenic: acute illness or injury    Amount and/or Complexity of Data Reviewed  Independent Historian: EMS  External Data Reviewed: labs.     Details: Hemoglobin today is 9, previously 8.9 yesterday, 8.9 on 3/18/2024, 9 on 3/11/2024  Labs: ordered.  Radiology: ordered and independent interpretation performed.     Details: Chest x-ray image reviewed, no obvious pneumothorax, other incidental findings deferred to radiology  ECG/medicine tests: ordered and independent interpretation performed. Decision-making details documented in ED  Course.  Discussion of management or test interpretation with external provider(s): Discussed with hospitalist and cardiology        Disposition and Plan     Clinical Impression:  1. Syncope, cardiogenic         Disposition:  Admit  3/26/2024  4:12 pm    Follow-up:  No follow-up provider specified.  We recommend that you schedule follow up care with a primary care provider within the next three months to obtain basic health screening including reassessment of your blood pressure.      Medications Prescribed:  There are no discharge medications for this patient.                        Hospital Problems       Present on Admission  Date Reviewed: 2/20/2024            ICD-10-CM Noted POA    * (Principal) Syncope, cardiogenic R55 3/26/2024 Unknown

## 2024-03-26 NOTE — ED QUICK NOTES
Orders for admission, patient is aware of plan and ready to go upstairs. Any questions, please call ED RN Esperanza at extension [phone].     Patient Covid vaccination status: Fully vaccinated     COVID Test Ordered in ED: None    COVID Suspicion at Admission: N/A    Running Infusions:  None    Mental Status/LOC at time of transport: AxOx4    Other pertinent information:   CIWA score: N/A   NIH score:  N/A

## 2024-03-26 NOTE — CONSULTS
[facility] CARDIOLOGY CONSULT      Daria Piedra is a 63 year old female who I assessed as follows:  Chief Complaint   Patient presents with    Syncope          REASON FOR CONSULTATION:    syncope            ASSESSMENT/PLAN:     IMPRESSIONS:  Syncope  Breast cancer, undergoing chemo (next tx was supposed to be tomorrow)  Recent NSVT on monitor, asymptomatic.(20 beat run at avg rate 184)  Elevated troponin        PLAN:  ECG reviewed  TSH Feb 2024 was normal   Given concern for VT, I think that CTA coronaries better test than stress test, as long as can get her heart rate lowered  Discussed with family at bedside  Saw Dr Melton for EP few days ago  Echo with strain. Last echo Jan 2024 with normal EF.            --------------------------------------------------------------------------------------------------------------------------------    HPI:         Getting chemo for breast cancer. Noted to have high HR so sent to Dr Yarbrough. Monitor with two asymptomatic episodes VT, longest 20 beats at avg . Saw EP Dr Melton few days ago. To have stress test.    Today at [facility], noted palpitations. Apple watch with  so she sat down. Palpitations stopped. She got up and then found herself on the floor. Out about 10 seconds. Stool incontinent. No seizure activity or confusion post. Back to normal.    Sinus rhythm in ER, . Troponin elevated. No chest pain.            No current outpatient medications on file.      Past Medical History:   Diagnosis Date    Body piercing     Ears    BRCA gene mutation negative in female 11/2023    Negative 84 gene hereditary cancer panel, report in media tab    Breast cyst 04/21/2015    Cancer (HCC) 2023    Right breast    History of stomach ulcers 2010    H. Pylori    Screening for other and unspecified genitourinary condition     Visual impairment     Contacts/Glasses    Wears glasses      Past Surgical History:   Procedure Laterality Date    BENIGN BIOPSY LEFT   2013    stereotactic          x4    CHOLECYSTECTOMY  2018    COLONOSCOPY  2010    complete, repeat 5 years    COLONOSCOPY      CYST ASPIRATION LEFT  8 years ago?    EGD      OTHER SURGICAL HISTORY  2012    MRI guided core breast biopsy left breast 3:00 and at the central region by Dr. Brooks at [facility]    UPPER GI ENDOSCOPY,EXAM  2011    EGD     Family History   Problem Relation Age of Onset    Heart Disorder Father     Other (Pulmonary fibrosis) Father     Other (Leukemia) Mother     Colon Cancer Son         \"precancerous anal\"    Cancer Son         skin    Diabetes Brother     Colon Polyps Brother     Colon Cancer Brother         pre-cancer cells removed    Heart Disorder Brother     Other (Benign tubular adenoma of the large intestine) Brother     Diabetes Brother     Diabetes Other     Colon Cancer Son         pre cancer anal removed      Social History:  Social History     Socioeconomic History    Marital status:    Tobacco Use    Smoking status: Never    Smokeless tobacco: Never   Vaping Use    Vaping Use: Never used   Substance and Sexual Activity    Alcohol use: Not Currently     Comment: Occasional glass of wine or beer if dinner out    Drug use: Never   Other Topics Concern    Caffeine Concern Yes     Comment: 1 q day    Stress Concern No    Weight Concern No    Special Diet No    Exercise Yes     Comment: 30 minutes on [employer] daily    Seat Belt Yes          Medications:  Current Facility-Administered Medications   Medication Dose Route Frequency    metoprolol tartrate (Lopressor) tab 50 mg  50 mg Oral Once PRN    Or    metoprolol tartrate (Lopressor) tab 100 mg  100 mg Oral Once PRN    metoprolol tartrate (Lopressor) tab 50 mg  50 mg Oral Once PRN    Or    metoprolol tartrate (Lopressor) tab 100 mg  100 mg Oral Once PRN    metoprolol tartrate (Lopressor) tab 50 mg  50 mg Oral Once    Or    metoprolol tartrate (Lopressor) tab 100 mg  100 mg Oral Once    [START  ON 3/27/2024] metoprolol tartrate (Lopressor) tab 50 mg  50 mg Oral Once    Or    [START ON 3/27/2024] metoprolol tartrate (Lopressor) tab 100 mg  100 mg Oral Once    [START ON 3/27/2024] metoprolol tartrate (Lopressor) tab 50 mg  50 mg Oral Once PRN    Or    [START ON 3/27/2024] metoprolol tartrate (Lopressor) tab 100 mg  100 mg Oral Once PRN    ALPRAZolam (Xanax) tab 0.25 mg  0.25 mg Oral Once PRN           Allergies  No Known Allergies            REVIEW OF SYSTEMS:   GENERAL HEALTH: no fevers  SKIN: denies any unusual skin lesions or rashes   EARS: no recent changes in hearing  EYE: no recent vision changes  THROAT: denies sore throat  RESPIRATORY: denies cough or shortness of breath with exertion  CARDIOVASCULAR: denies chest pain, syncope, or palpitations  GI: denies abdominal pain, nausea and vomiting  NEURO: denies headaches and focal neurologic symptoms  PSYCH: no recent depression  ENDOCRINE: no change in sleep pattern  HEME: no easy bruising  All other systems reviewed and negative.          EXAM:         TELE: sinus rhythm          /68   Pulse 92   Temp 98.2 °F (36.8 °C) (Oral)   Resp 20   Ht 5' 6\" (1.676 m)   Wt 143 lb (64.9 kg)   LMP 2008 (Approximate)   SpO2 99%   BMI 23.08 kg/m²   Temp (24hrs), Av.2 °F (36.8 °C), Min:98.2 °F (36.8 °C), Max:98.2 °F (36.8 °C)    Wt Readings from Last 3 Encounters:   24 143 lb (64.9 kg)   24 144 lb 12.8 oz (65.7 kg)   24 144 lb (65.3 kg)       No intake or output data in the 24 hours ending 24 1646      GENERAL: well developed, well nourished, in no apparent distress  SKIN: no rashes  HEENT: atraumatic, normocephalic, throat without erythema  NECK: supple, no bruits  LUNGS: clear to auscultation  CARDIO: RRR without murmur or S3   GI: soft, nontender  EXTREMITIES: no cyanosis, clubbing or edema  NEUROLOGY: alert  PSYCH: cooperative          LABORATORY DATA:               ECG:         ECHO :  1. Left ventricle: The  cavity size is normal. Wall thickness is normal.      Systolic function is at the lower limits of normal by visual assessment.      The estimated ejection fraction is 55%. Cannot exclude hypokinesis of the      basal to mid anteroseptal walls. Left ventricular diastolic function      parameters are normal.   2. Aortic valve: There is mild regurgitation.   3. Mitral valve: The annulus is mildly calcified. There is mild      regurgitation.   4. Right ventricle: The cavity size is normal. Wall thickness is normal.      Systolic function is normal by visual assessment. Estimation of the right      ventricular systolic pressure is within the normal range. The RV pressure      during systole is 34mm Hg.   5. Tricuspid valve: There is mild regurgitation.   6. Pericardium, extracardiac: There is no significant pericardial effusion.   Impressions:     - Prior study date: 10/27/23.   - Cannot exclude subtle wall motion abnormality in the anteroseptal walls.     Suggest use of global longitudinal strain measurement on subsequent     echocardiograms to monitor for chemotherapy related cardiotoxicity.         Labs:  Recent Labs   Lab 03/26/24  1455   *   BUN 13   CREATSERUM 1.03*   CA 9.8      K 3.3*      CO2 21.0     No results for input(s): \"TROP\" in the last 168 hours.  Recent Labs   Lab 03/26/24  1455   RBC 2.69*   HGB 9.0*   HCT 27.8*   .3*   MCH 33.5   MCHC 32.4   RDW 15.6*   NEPRELIM 3.75   WBC 5.1   .0     Recent Labs   Lab 03/26/24  1455   TROPHS 142*       Imaging:  XR CHEST AP PORTABLE  (CPT=71045)    Result Date: 3/26/2024  PROCEDURE: XR AP PORTABLE  (CPT=71045) TIME: 1505  COMPARISON: None.  INDICATIONS: Heart palpitations and syncope episode today.  TECHNIQUE:   Single view.   Findings and impression:  Normal heart size.  No edema.  Evelin catheter in the lower SVC.  Normal lung volumes.  Linear scar or atelectasis upper right lung.  Lungs otherwise clear.  Normal pleura.  No free  air     Dictated by (CST): Antonio Patel MD on 3/26/2024 at 3:33 PM     Finalized by (CST): Antonio Patel MD on 3/26/2024 at 3:34 PM                Markel Stearns MD

## 2024-03-26 NOTE — PLAN OF CARE
Patient alert and oriented x 4. Vitals stable on room air. Patient denies pain. Patient to receive CTA coronary tomorrow. Patient NPO after midnight. Call light within reach.    Problem: Patient Centered Care  Goal: Patient preferences are identified and integrated in the patient's plan of care  Description: Interventions:  - What would you like us to know as we care for you? From home with   - Provide timely, complete, and accurate information to patient/family  - Incorporate patient and family knowledge, values, beliefs, and cultural backgrounds into the planning and delivery of care  - Encourage patient/family to participate in care and decision-making at the level they choose  - Honor patient and family perspectives and choices  3/26/2024 1843 by Jhony Bowles, RN  Outcome: Progressing  3/26/2024 1843 by Jhony Bowles RN  Outcome: Progressing     Problem: Patient/Family Goals  Goal: Patient/Family Long Term Goal  Description: Patient's Long Term Goal: Be able to discharge    Interventions:  - Medication administration  - Monitoring vitals  - See additional Care Plan goals for specific interventions  3/26/2024 1843 by Jhony Bowles RN  Outcome: Progressing  3/26/2024 1843 by Jhony Bowles, RN  Outcome: Progressing  Goal: Patient/Family Short Term Goal  Description: Patient's Short Term Goal: Resolve syncopal episodes    Interventions:   - Monitor vitals  - Diagnostic imaging  - See additional Care Plan goals for specific interventions  3/26/2024 1843 by Jhony Bowles, RN  Outcome: Progressing  3/26/2024 1843 by Jhony Bowles, RN  Outcome: Progressing

## 2024-03-27 ENCOUNTER — APPOINTMENT (OUTPATIENT)
Dept: CV DIAGNOSTICS | Facility: HOSPITAL | Age: 64
End: 2024-03-27
Attending: INTERNAL MEDICINE
Payer: COMMERCIAL

## 2024-03-27 ENCOUNTER — APPOINTMENT (OUTPATIENT)
Dept: CT IMAGING | Facility: HOSPITAL | Age: 64
End: 2024-03-27
Attending: INTERNAL MEDICINE
Payer: COMMERCIAL

## 2024-03-27 VITALS
RESPIRATION RATE: 11 BRPM | HEIGHT: 66 IN | HEART RATE: 64 BPM | DIASTOLIC BLOOD PRESSURE: 68 MMHG | TEMPERATURE: 98 F | OXYGEN SATURATION: 99 % | WEIGHT: 143 LBS | SYSTOLIC BLOOD PRESSURE: 112 MMHG | BODY MASS INDEX: 22.98 KG/M2

## 2024-03-27 LAB
ANION GAP SERPL CALC-SCNC: 7 MMOL/L (ref 0–18)
ATRIAL RATE: 108 BPM
BASOPHILS # BLD AUTO: 0.02 X10(3) UL (ref 0–0.2)
BASOPHILS NFR BLD AUTO: 0.5 %
BUN BLD-MCNC: 9 MG/DL (ref 9–23)
BUN/CREAT SERPL: 13 (ref 10–20)
C DIFF TOX B STL QL: NEGATIVE
CALCIUM BLD-MCNC: 10 MG/DL (ref 8.7–10.4)
CHLORIDE SERPL-SCNC: 111 MMOL/L (ref 98–112)
CO2 SERPL-SCNC: 24 MMOL/L (ref 21–32)
CREAT BLD-MCNC: 0.69 MG/DL
DEPRECATED RDW RBC AUTO: 57.1 FL (ref 35.1–46.3)
EGFRCR SERPLBLD CKD-EPI 2021: 96 ML/MIN/1.73M2 (ref 60–?)
EOSINOPHIL # BLD AUTO: 0.04 X10(3) UL (ref 0–0.7)
EOSINOPHIL NFR BLD AUTO: 1.1 %
ERYTHROCYTE [DISTWIDTH] IN BLOOD BY AUTOMATED COUNT: 15.7 % (ref 11–15)
GLUCOSE BLD-MCNC: 98 MG/DL (ref 70–99)
HCT VFR BLD AUTO: 25.1 %
HGB BLD-MCNC: 8.5 G/DL
IMM GRANULOCYTES # BLD AUTO: 0.02 X10(3) UL (ref 0–1)
IMM GRANULOCYTES NFR BLD: 0.5 %
LYMPHOCYTES # BLD AUTO: 0.67 X10(3) UL (ref 1–4)
LYMPHOCYTES NFR BLD AUTO: 18.3 %
MAGNESIUM SERPL-MCNC: 1.9 MG/DL (ref 1.6–2.6)
MCH RBC QN AUTO: 34.1 PG (ref 26–34)
MCHC RBC AUTO-ENTMCNC: 33.9 G/DL (ref 31–37)
MCV RBC AUTO: 100.8 FL
MONOCYTES # BLD AUTO: 0.25 X10(3) UL (ref 0.1–1)
MONOCYTES NFR BLD AUTO: 6.8 %
NEUTROPHILS # BLD AUTO: 2.66 X10 (3) UL (ref 1.5–7.7)
NEUTROPHILS # BLD AUTO: 2.66 X10(3) UL (ref 1.5–7.7)
NEUTROPHILS NFR BLD AUTO: 72.8 %
OSMOLALITY SERPL CALC.SUM OF ELEC: 293 MOSM/KG (ref 275–295)
P AXIS: 86 DEGREES
P-R INTERVAL: 148 MS
PLATELET # BLD AUTO: 269 10(3)UL (ref 150–450)
POTASSIUM SERPL-SCNC: 4.4 MMOL/L (ref 3.5–5.1)
POTASSIUM SERPL-SCNC: 4.4 MMOL/L (ref 3.5–5.1)
Q-T INTERVAL: 334 MS
QRS DURATION: 70 MS
QTC CALCULATION (BEZET): 447 MS
R AXIS: 15 DEGREES
RBC # BLD AUTO: 2.49 X10(6)UL
SODIUM SERPL-SCNC: 142 MMOL/L (ref 136–145)
T AXIS: 45 DEGREES
VENTRICULAR RATE: 108 BPM
WBC # BLD AUTO: 3.7 X10(3) UL (ref 4–11)

## 2024-03-27 PROCEDURE — 93306 TTE W/DOPPLER COMPLETE: CPT | Performed by: INTERNAL MEDICINE

## 2024-03-27 PROCEDURE — 80048 BASIC METABOLIC PNL TOTAL CA: CPT | Performed by: HOSPITALIST

## 2024-03-27 PROCEDURE — 85025 COMPLETE CBC W/AUTO DIFF WBC: CPT | Performed by: HOSPITALIST

## 2024-03-27 PROCEDURE — 84132 ASSAY OF SERUM POTASSIUM: CPT | Performed by: HOSPITALIST

## 2024-03-27 PROCEDURE — 75574 CT ANGIO HRT W/3D IMAGE: CPT | Performed by: INTERNAL MEDICINE

## 2024-03-27 PROCEDURE — 83735 ASSAY OF MAGNESIUM: CPT | Performed by: HOSPITALIST

## 2024-03-27 RX ORDER — DILTIAZEM HYDROCHLORIDE 5 MG/ML
INJECTION INTRAVENOUS
Status: COMPLETED
Start: 2024-03-27 | End: 2024-03-27

## 2024-03-27 RX ORDER — METOPROLOL SUCCINATE 25 MG/1
25 TABLET, EXTENDED RELEASE ORAL DAILY
Qty: 30 TABLET | Refills: 0 | Status: SHIPPED | OUTPATIENT
Start: 2024-03-27

## 2024-03-27 RX ORDER — METOPROLOL TARTRATE 1 MG/ML
INJECTION, SOLUTION INTRAVENOUS
Status: COMPLETED
Start: 2024-03-27 | End: 2024-03-27

## 2024-03-27 RX ORDER — HEPARIN SODIUM (PORCINE) LOCK FLUSH IV SOLN 100 UNIT/ML 100 UNIT/ML
500 SOLUTION INTRAVENOUS ONCE
Status: COMPLETED | OUTPATIENT
Start: 2024-03-27 | End: 2024-03-27

## 2024-03-27 RX ORDER — HEPARIN SODIUM (PORCINE) LOCK FLUSH IV SOLN 100 UNIT/ML 100 UNIT/ML
SOLUTION INTRAVENOUS
Status: COMPLETED
Start: 2024-03-27 | End: 2024-03-27

## 2024-03-27 NOTE — DISCHARGE SUMMARY
[facility] Medicine Discharge Summary   Patient ID:  Daria Piedra  [id number]  63 year old  7/6/1960    Admit date: 3/26/2024    Discharge date and time: 3/27/2024     Attending Physician: Myriam Buchanan MD     Primary Care Physician: Maegan Petit MD     Admit Dx: Syncope, cardiogenic [R55]      Discharge Diagnosis   tachcyardia- sinus + some NSVT episodes  Aortic regurg  Hypokalemia fm GI losses- diarrhea  Hypomagnesemia  Breast cancer on neoadjuant chemo plan for bl mastectomy this summer  Troponin elevation    Discharged Condition: stable    Disposition: home    Important follow up:  Maegan Petit MD  [address]  [facility] [id number] [phone]    Schedule an appointment as soon as possible for a visit in 1 week(s)      Italo Yarbrough MD  [address]  [facility] [address]  [phone]    Follow up      Be Melton MD  [address]  [facility] [address]  [phone]              Please refer to prior H&P on admission date.    Hospital Course:    echo q 3 mo due in apr 2024- ordered for here w strain protocol- prior have all had normal LVEF done and EF ok noted aortic regurg- cards f/u  - brenda cards recs- due for ischemic eval- getting a CTA coronary here done and ok  - d/w DR Quoc woodall on toprol 25 for episodes NSVT. EP f/u. Suspect uncontrolled anxiety contributing to sinus tachcyardia  - TSH ok, EKG ok  - K > 4 Mg > 2- repletion protocol ordered  - BB ordered  - tele monitoring  - anxiolytics prn    Day of discharge Exam   Vitals:    03/27/24 1400   BP:    Pulse: 64   Resp:    Temp:        Exam on day of discharge:  Gen: No acute distress  CV: RRR  Lungs: CTAB, good respiratory effort  Abdomen: s/nt/nd  Neuro: no focal deficits      Discharge meds     Medication List        START taking these medications      metoprolol succinate ER 25 MG Tb24  Commonly known as: Toprol XL  Take 1 tablet (25 mg total) by mouth daily.            CONTINUE taking these  medications      loperamide 2 MG Caps  Commonly known as: Imodium     TAXOL IV               Where to Get Your Medications        These medications were sent to [id number] - [place], [place] - [address] AT [facility] [address], [phone], [phone]  [address], [facility] [address]      Phone: [phone]   metoprolol succinate ER 25 MG Tb24         Consults: IP CONSULT TO CARDIOLOGY    Radiology: XR CHEST AP PORTABLE  (CPT=71045)    Result Date: 3/26/2024         PROCEDURE: XR AP PORTABLE  (CPT=71045) TIME: 1505  COMPARISON: None.  INDICATIONS: Heart palpitations and syncope episode today.  TECHNIQUE:   Single view.   Findings and impression:  Normal heart size.  No edema.  Evelin catheter in the lower SVC.  Normal lung volumes.  Linear scar or atelectasis upper right lung.  Lungs otherwise clear.  Normal pleura.  No free air     Dictated by (CST): Antonio Patel MD on 3/26/2024 at 3:33 PM     Finalized by (CST): Antonio Patel MD on 3/26/2024 at 3:34 PM             Operative Procedures:        Total Time Coordinating Care: > than 30 minutes    Patient had opportunity to ask questions and state understand and agree with therapeutic plan as outlined      Myriam Buchanan MD  [facility] [place] [facility]  [phone]  3/27/2024  12:45 PM

## 2024-03-27 NOTE — PAYOR COMM NOTE
--------------  ADMISSION REVIEW     Payor: YANA ROSALES  Subscriber #:  [id number]  Authorization Number: [id number]    Admit date: 3/26/24  Admit time:  5:38 PM       History   HPI  63-year-old female with history of breast cancer currently on neoadjuvant chemotherapy prior to planned mastectomy this summer, recent episodes of tachycardia and nonsustained V. tach on home monitor presents for evaluation after syncopal episode.  Patient was playing mini golf with family, started to feel palpitations and looked at her Apple Watch, noted heart rate of 140.  She sat down and when she was ready to stand up stood up and started walking, she reports she remembers is being on the floor.  Someone tried to help her up and she fell down again.  She had bowel incontinence.  She now reports feeling back to baseline, no current complaints, no chest pain or shortness of breath.    ED Triage Vitals [03/26/24 1451]   /86   Pulse 117   Resp 19   Temp 98.2 °F (36.8 °C)   Temp src Oral   SpO2 99 %   O2 Device None (Room air)     Physical Exam  HENT:      Head: Normocephalic and atraumatic.   Eyes:      Conjunctiva/sclera: Conjunctivae normal.   Cardiovascular:      Rate and Rhythm: Normal rate and regular rhythm.      Heart sounds: Normal heart sounds.   Pulmonary:      Effort: Pulmonary effort is normal. No respiratory distress.      Breath sounds: Normal breath sounds.   Abdominal:      General: Bowel sounds are normal. There is no distension.      Palpations: Abdomen is soft.      Tenderness: There is no abdominal tenderness. There is no guarding or rebound.   Musculoskeletal:         General: Normal range of motion.      Cervical back: Normal range of motion and neck supple.   Skin:     General: Skin is warm and dry.      Findings: No rash.   Neurological:      General: No focal deficit present.      Mental Status: She is alert and oriented to person, place, and time.     Labs Reviewed   COMP METABOLIC PANEL (14) - Abnormal;  Notable for the following components:       Result Value    Glucose 133 (*)     Potassium 3.3 (*)     Creatinine 1.03 (*)     Calculated Osmolality 296 (*)     AST 41 (*)     Globulin  2.6 (*)     All other components within normal limits   TROPONIN I HIGH SENSITIVITY - Abnormal; Notable for the following components:    Troponin I (High Sensitivity) 142 (*)     All other components within normal limits   LIPID PANEL - Abnormal; Notable for the following components:    HDL Cholesterol 39 (*)     Triglycerides 188 (*)     All other components within normal limits   POCT GLUCOSE - Abnormal; Notable for the following components:    POC Glucose  145 (*)     All other components within normal limits   CBC W/ DIFFERENTIAL - Abnormal; Notable for the following components:    RBC 2.69 (*)     HGB 9.0 (*)     HCT 27.8 (*)     .3 (*)     RDW-SD 58.3 (*)     RDW 15.6 (*)     Lymphocyte Absolute 0.97 (*)    EKG    Rate, intervals and axes as noted on EKG Report.  Rate: 108  Rhythm: Sinus Rhythm  Reading: Sinus tachycardia with normal intervals, no STEMI  Disposition and Plan   Clinical Impression:  1. Syncope, cardiogenic       Disposition:  Admit  3/26/2024  4:12 pm    3/27/24  HISTORY AND PHYSICAL   History of Present Illness:   Patient is a 63 year old female with PMH sig for breast cancer on neoadjuvant chemo - changed regimen due to LFT elevation also noted tachycardia-during chemo sessions -some attributed to acute anemia (hg to 7) but persisted despite transfusions. she has had echos on chemo regimen due to cardiotoxicity risk with normal EFs and noted on heart monitor w episodes NSVT was sent to EP and due to have an ischemic eval w a nuc stress test. Today while out playing golf had an episode of LOC for several seconds- + associated incontinence- here noted w K 3.3 Mg 1.7 and trop up. Currently is quite upset that she has to stay in the hospital but otherwise asx.     /68   Pulse 92   Temp 98.2 °F (36.8  °C) (Oral)   Resp 20   Ht 5' 6\" (1.676 m)   Wt 143 lb (64.9 kg)   LMP 12/21/2008 (Approximate)   SpO2 99%   BMI 23.08 kg/m²      GEN:  AAO  NECK: supple, no LAD  HEENT- sclera anti-icteric, OP- MMM  CV: tachy, regular  LUNGS: CTAB, normal resp effort  ABL soft, NT/ND, NABS, no HSC  EXT: no LE edema b/l , DP pulses 2+ b/l  Neuro: sensation intact, no focal deficits  SKIN- no rashes, no lesion  PSYCH- normal mentation, ++ anxious affect    Lab Results   Component Value Date     WBC 5.1 03/26/2024     HGB 9.0 03/26/2024     HCT 27.8 03/26/2024     .0 03/26/2024     CREATSERUM 1.03 03/26/2024     BUN 13 03/26/2024      03/26/2024     K 3.3 03/26/2024      03/26/2024     CO2 21.0 03/26/2024      03/26/2024     CA 9.8 03/26/2024     ALB 4.8 03/26/2024     ALKPHO 77 03/26/2024     BILT 0.7 03/26/2024     TP 7.4 03/26/2024     AST 41 03/26/2024     ALT 43 03/26/2024     DDIMER 0.53 03/26/2024     MG 1.7 03/26/2024     PGLU 145 03/26/2024       Radiology: XR CHEST AP PORTABLE  (CPT=71045)   Result Date: 3/26/2024   Findings and impression:  Normal heart size.  No edema.  Evelin catheter in the lower SVC.  Normal lung volumes.  Linear scar or atelectasis upper right lung.  Lungs otherwise clear.  Normal pleura.  No free air     Dictated by (CST): Antonio Patel MD on 3/26/2024 at 3:33 PM     Finalized by (CST): Antonio Patel MD on 3/26/2024 at 3:34 PM            ASSESSMENT / PLAN:  63YOF undergoing neoadjuvant breast cancer noted w recent tachcyardia and episodes asx NSVT (on zio monitor) here sp syncope      Syncope  NSVT episodes  Hypokalemia fm GI losses- diarrhea  Hypomagnesemia  Breast cancer on neoadjuant chemo plan for bl mastectomy this summer  Troponin elevation      plan  - echo q 3 mo due in apr 2024- ordered for here w strain protocol- prior have all had normal LVEF  - brenda cards recs- due for ischemic eval- getting a CTA coronary here  - TSH ok, EKG ok  - K > 4 Mg > 2- repletion  protocol ordered  - BB ordered  - tele monitoring  - anxiolytics prn       CARDIOLOGY  Impression/Plan:  63 year old female presenting with:     Syncope  Breast cancer, undergoing chemo (next tx was supposed to be today)  Recent NSVT on monitor, asymptomatic.(20 beat run at avg rate 184)  Elevated troponin     - TTE, coronary CTA ordered and pending  - Consider low dose beta blocker/calcium channel blocker pending results of above workup  - Monitor on tele    MEDICATIONS ADMINISTERED IN LAST 1 DAY:  dilTIAZem (cardIZEM) 25 mg/5mL injection 5 mg       Date Action Dose Route User    3/27/2024 1316 Given 5 mg Intravenous Chrissie Shah, RN    3/27/2024 1255 Given 5 mg Intravenous ShahChrissie, RN    3/27/2024 1246 Given 5 mg Intravenous ShahChrissie, RN    3/27/2024 1240 Given 5 mg Intravenous Chrissie Shah RN          dilTIAZem (cardIZEM) 25 mg/5mL injection       Date Action Dose Route User    3/27/2024 1240 Given 5 mg Intravenous Chrissie Shah RN          enoxaparin (Lovenox) 40 MG/0.4ML SUBQ injection 40 mg       Date Action Dose Route User    3/26/2024 1830 Given 40 mg Subcutaneous (Right Lower Abdomen) Jhony Bowles RN     magnesium oxide (Mag-Ox) tab 400 mg       Date Action Dose Route User    3/26/2024 1830 Given 400 mg Oral Jhony Bowles RN          metoprolol (Lopressor) 5 mg/5mL injection 5 mg       Date Action Dose Route User    3/27/2024 1235 Given 5 mg Intravenous Chrissie Shah RN    3/27/2024 1219 Given 5 mg Intravenous ShahChrissie, RN    3/27/2024 1213 Given 5 mg Intravenous ShahChrissie, RN    3/27/2024 1208 Given 5 mg Intravenous Chrissie Shah RN          metoprolol (Lopressor) 5 mg/5mL injection       Date Action Dose Route User    3/27/2024 1208 Given 5 mg Intravenous Chrissie Shah RN          metoprolol tartrate (Lopressor) tab 100 mg       Date Action Dose Route User    3/27/2024 0725 Given 100 mg Oral Jhony Bowles RN          metoprolol tartrate (Lopressor)  tab 100 mg       Date Action Dose Route User    3/26/2024 2002 Given 100 mg Oral Arvin Escobedo RN          metoprolol tartrate (Lopressor) tab 100 mg       Date Action Dose Route User    3/27/2024 0546 Given 100 mg Oral Arvin Escobedo RN          metoprolol tartrate (Lopressor) tab 100 mg       Date Action Dose Route User    3/27/2024 0858 Given 100 mg Oral Jhony Bowles RN          nitroglycerin (Nitrostat) SL tab 0.4 mg       Date Action Dose Route User    3/27/2024 1316 Given 0.4 mg Sublingual Chrissie Shah RN          potassium chloride (Klor-Con) 20 MEQ oral powder 40 mEq       Date Action Dose Route User    3/26/2024 2238 Given 40 mEq Oral Arvin Escobedo RN    3/26/2024 1830 Given 40 mEq Oral Jhony Bowles RN            Vitals (last day)       Date/Time Temp Pulse Resp BP SpO2 Weight O2 Device O2 Flow Rate (L/min) Who    03/27/24 0544 98.2 °F (36.8 °C) 80 18 110/70 99 % -- None (Room air) -- WT    03/26/24 1957 99.4 °F (37.4 °C) 102 20 133/78 99 % -- None (Room air) -- WT    03/26/24 1748 98.3 °F (36.8 °C) 110 19 141/83 99 % -- None (Room air) -- NN    03/26/24 1451 98.2 °F (36.8 °C) 117 19 155/86 99 % 143 lb None (Room air) -- SE

## 2024-03-27 NOTE — CDS QUERY
How to answer this Query:     1.) DON'T CLICK COSIGN BUTTON FIRST  2.) Click \"3 dots...\" to the right of cosign button and click EDIT on the toolbar.  2.) Type an \"X\" in the bracket for the diagnosis that applies. (You may also add additional clinical details as you feel necessary to substantiate your response).   3.) Finally click \"Sign\" to complete response.  Thank You  Dear Dr. Buchanan:     Please provide additional documentation in the patient's medical record supportive of documented diagnosis of  Hyponatremia     (  )     Hyponatremia   remains a known or suspected condition for this patient and is further supported by ( provide additional documentation ):__________         (  x )     Hyponatremia was ruled out          (   )     Other (please specify)___________________________        __________________________________________________________________   Clinical Indicators:  H&P: Hyponatremia  3-26, 3-27 Sodium 142  Treatment:  No IV Fluids noted  Risk factors: Breast cancer on chemotherapy      If you have any questions, please contact Clinical :  Isaura Felder RN CCDS  at  [phone]   Thank You!     THIS FORM IS A PERMANENT PART OF THE MEDICAL RECORD

## 2024-03-27 NOTE — PLAN OF CARE
Patient alert and oriented x 4. Vitals stable on room air. Patient denies pain. Patient cleared for discharge home with outpatient follow-up. Port deaccessed. AVS completed and discussed with patient and . There were no further questions.    Problem: Patient Centered Care  Goal: Patient preferences are identified and integrated in the patient's plan of care  Description: Interventions:  - What would you like us to know as we care for you? From home with   - Provide timely, complete, and accurate information to patient/family  - Incorporate patient and family knowledge, values, beliefs, and cultural backgrounds into the planning and delivery of care  - Encourage patient/family to participate in care and decision-making at the level they choose  - Honor patient and family perspectives and choices  Outcome: Adequate for Discharge     Problem: Patient/Family Goals  Goal: Patient/Family Long Term Goal  Description: Patient's Long Term Goal: Be able to dsicahrge    Interventions:  - Diagnostic imaging  - See additional Care Plan goals for specific interventions  Outcome: Adequate for Discharge  Goal: Patient/Family Short Term Goal  Description: Patient's Short Term Goal: Resolve syncopal episodes    Interventions:   - Monitor vitals  - Change position slowly  - See additional Care Plan goals for specific interventions  Outcome: Adequate for Discharge

## 2024-03-27 NOTE — IMAGING NOTE
HX TAKEN: SYNCOPAL EPISODE    PT CONSENTED    BASELINE VITAL SIGNS: SEE FLOWSHEETS BMI 23    CTA ORDERED BY DR RIVERA    METOPROLOL PO GIVEN 100 MILLIGRAMS  AT 0546 BY FLOOR RN SEE MAR    METOPROLOL PO GIVEN 100 MILLIGRAMS  AT 0725 BY FLOOR RN SEE MAR    METOPROLOL PO GIVEN 100 MILLIGRAMS  AT 0858 BY FLOOR RN SEE MAR    OK'D TO USE PORT    GFR = >60   CREATINE = 0.69    TO RAD HOLDING AT 1205    1208: HR 72 /81 METOPROLOL 5 MILLIGRAMS GIVEN IV PUSH  SEE  PROTOCOL    1213: HR 72 /66 METOPROLOL 5 MILLIGRAMS GIVEN IV PUSH     1219: HR 70 /68 METOPROLOL 5 MILLIGRAMS  GIVEN IV PUSH    1225: BP 99/63, SPOKE TO LASHAWN WELLS TO ENSURE PT IS NO LONGER NPO. PT NOW ON GENERAL DIET. WATER BOTTLE GIVEN TO PT TO HYDRATE AND WILL RE-ASSESS BP    1235: HR 68 /67 METOPROLOL 5 MILLIGRAMS  GIVEN IV PUSH    1240: HR 65 /68 CARDIZEM 5 MILLIGRAMS  GIVEN IV PUSH     1246: HR 63 /67 CARDIZEM 5 MILLIGRAMS  GIVEN IV PUSH     1255: HR 60 /62 CARDIZEM 5 MILLIGRAMS  GIVEN IV PUSH     TO CT TABLE @ 1315    CONNECT TO MONITOR  HR 61 /55      CARDIZEM 5 MILLIGRAMS  GIVEN IV PUSH     NITROGLYCERIN 0.4 MILLIGRAMS SUBLINGUAL GIVEN AT 1316     CALCIUM SCORE COMPLETED AT 1323     INJECTION STARTED AT 1332 HR 53 DURING SCAN PROCEDURE COMPLETE    POST SCAN HR 68 BP 83/41 AT 1334. Pt asymptomatic, given water.    1335 TRANSPORT PENDING BACK TO ROOM. REPORT CALLED TO RUSSELL HARDIN. INFORMED TO RECHECK PT'S BP

## 2024-03-27 NOTE — PROGRESS NOTES
[facility]  part of [facility]    Cardiology Progress Note    Daria Piedra Patient Status:  Inpatient    1960 MRN [id number]   Location [facility] 3W/SW Attending Myriam Buchanan MD   Hosp Day # 1 PCP Maegan Petit MD       Impression/Plan:  63 year old female presenting with:    Syncope  Breast cancer, undergoing chemo (next tx was supposed to be today)  Recent NSVT on monitor, asymptomatic.(20 beat run at avg rate 184)  Elevated troponin    - TTE, coronary CTA ordered and pending  - Consider low dose beta blocker/calcium channel blocker pending results of above workup  - Monitor on tele  - Will follow     Subjective: No events overnight.  Today patient denies chest pain, palpitations, dyspnea.    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)    Laboratory examination ordered as part of a routine general medical examination    Duodenal ulcer, unspecified as acute or chronic, without hemorrhage, perforation, or obstruction    Other B-complex deficiencies    Unspecified vitamin D deficiency    Left breast mass    Sclerosing adenosis of breast    Breast cyst    History of adenomatous polyp of colon    Benign neoplasm of sigmoid colon    BRCA gene mutation negative in female    Anemia, unspecified    Low grade myelodysplastic syndrome lesions (HCC)    Syncope, cardiogenic       Objective:   Temp: 98.4 °F (36.9 °C)  Pulse: 72  Resp: 11  BP: 112/68    Intake/Output:   No intake or output data in the 24 hours ending 24 0931    Last 3 Weights   24 1754 143 lb (64.9 kg)   24 1451 143 lb (64.9 kg)   24 1100 144 lb 12.8 oz (65.7 kg)   24 0819 144 lb (65.3 kg)       Tele: NSR    Physical Exam:    General: Alert and oriented x 3. No apparent distress. No respiratory or constitutional distress.  HEENT: Normocephalic, anicteric sclera, neck supple, no thyromegaly or adenopathy.  Neck: No JVD, carotids 2+, no  bruits.  Cardiac: Regular rate and rhythm. S1, S2 normal. No murmur, pericardial rub, S3, thrill, heave or extra cardiac sounds.  Lungs: Clear without wheezes, rales, rhonchi or dullness.  Normal excursions and effort.  Abdomen: Soft, non-tender. No organosplenomegally, mass or rebound, BS-present.  Extremities: Without clubbing, cyanosis or edema.  Peripheral pulses are 2+.  Neurologic: Alert and oriented, normal affect. No motor or coordinational deficit.  Skin: Warm and dry.     Laboratory/Data:    Labs:    Lab Results   Component Value Date    DDIMER 0.53 03/26/2024       Recent Labs   Lab 03/26/24  1455 03/27/24  0301   WBC 5.1 3.7*   HGB 9.0* 8.5*   .3* 100.8*   .0 269.0       Recent Labs   Lab 03/26/24  1455 03/27/24  0301    142   K 3.3* 4.4  4.4    111   CO2 21.0 24.0   BUN 13 9   CREATSERUM 1.03* 0.69   CA 9.8 10.0   MG 1.7 1.9   * 98       Recent Labs   Lab 03/26/24  1455   ALT 43   AST 41*   ALB 4.8       No results for input(s): \"TROP\" in the last 168 hours.    Allergies:   No Known Allergies    Medications:  Current Facility-Administered Medications   Medication Dose Route Frequency    enoxaparin (Lovenox) 40 MG/0.4ML SUBQ injection 40 mg  40 mg Subcutaneous Daily    acetaminophen (Tylenol Extra Strength) tab 500 mg  500 mg Oral Q4H PRN    polyethylene glycol (PEG 3350) (Miralax) 17 g oral packet 17 g  17 g Oral Daily PRN    sennosides (Senokot) tab 17.2 mg  17.2 mg Oral Nightly PRN    bisacodyl (Dulcolax) 10 MG rectal suppository 10 mg  10 mg Rectal Daily PRN    fleet enema (Fleet) 7-19 GM/118ML rectal enema 133 mL  1 enema Rectal Once PRN    ondansetron (Zofran) 4 MG/2ML injection 4 mg  4 mg Intravenous Q6H PRN    metoclopramide (Reglan) 5 mg/mL injection 10 mg  10 mg Intravenous Q8H PRN    nitroglycerin (Nitrostat) SL tab 0.4 mg  0.4 mg Sublingual Once    metoprolol (Lopressor) 5 mg/5mL injection 5 mg  5 mg Intravenous See Admin Instructions    Or    dilTIAZem  (cardIZEM) 25 mg/5mL injection 5 mg  5 mg Intravenous See Admin Instructions    metoprolol tartrate (Lopressor) tab 50 mg  50 mg Oral Once PRN    Or    metoprolol tartrate (Lopressor) tab 100 mg  100 mg Oral Once PRN       Krishna Kumari MD  3/27/2024  9:31 AM

## 2024-03-28 ENCOUNTER — TELEPHONE (OUTPATIENT)
Dept: FAMILY MEDICINE CLINIC | Facility: CLINIC | Age: 64
End: 2024-03-28

## 2024-03-28 ENCOUNTER — PATIENT OUTREACH (OUTPATIENT)
Dept: CASE MANAGEMENT | Age: 64
End: 2024-03-28

## 2024-03-28 DIAGNOSIS — Z02.9 ENCOUNTERS FOR UNSPECIFIED ADMINISTRATIVE PURPOSE: ICD-10-CM

## 2024-03-28 DIAGNOSIS — R55 SYNCOPE, CARDIOGENIC: Primary | ICD-10-CM

## 2024-03-28 PROCEDURE — 1111F DSCHRG MED/CURRENT MED MERGE: CPT

## 2024-03-28 NOTE — PROGRESS NOTES
Initial Post Discharge Follow Up   Discharge Date: 3/27/24  Contact Date: 3/28/2024    Consent Verification:  Assessment Completed With: Patient  HIPAA Verified?  Yes    Discharge Dx:   Tachcyardia  Aortic regurg  Hypokalemia fm GI losses- diarrhea  Hypomagnesemia  Breast cancer on neoadjuant chemo plan for bl mastectomy this summer  Troponin elevation    General:   How have you been since your discharge from the hospital? The patient reports feeling well at home. The patient denies any dizziness/syncope, weakness, confusion, SOB, chest pain, palpitations or change in heart beats. The patient denies any diarrhea at home. The patient reported this has been well controlled with Rx loperamide.  Do you have any pain since discharge?  No    How well was your pain managed while in the hospital?   On a scale of 1-5   1- Very Poor and 5- Very well   Very Well  When you were leaving the hospital were your discharge instructions reviewed with you? Yes  How well were your discharge instructions explained to you?   On a scale of 1-5   1- Very Poor and 5- Very well   Very Well  Do you have any questions about your discharge instructions?  No  Before leaving the hospital was your diagnoses explained to you? Yes  Do you have any questions about your diagnoses? No  Are you able to perform normal daily activities of living as you have prior to your hospital stay (dressing, bathing, ambulating to the bathroom, etc)? yes  (NCM) Was patient given a different diet per AVS? no; the patient will resume ensure per oncology.     Medications:   Current Outpatient Medications   Medication Sig Dispense Refill    metoprolol succinate ER 25 MG Oral Tablet 24 Hr Take 1 tablet (25 mg total) by mouth daily. 30 tablet 0    PACLitaxel (TAXOL IV) Inject into the vein.      loperamide 2 MG Oral Cap Take 1 capsule (2 mg total) by mouth 4 (four) times daily as needed for Diarrhea. Take in combination with ensure supplement       Were there any changes to  your current medication(s) noted on the AVS? Yes  If so, were these medication changes discussed with you prior to leaving the hospital? Yes  If a new medication was prescribed:    Was the new medication's purpose & side effects reviewed? Yes  Do you have any questions about your new medication? No  Did you  your discharge medications when you left the hospital? Yes  Let's go over your medications together to make sure we are not missing anything. Medications Reviewed  Are there any reasons that keep you from taking your medication as prescribed? No  Are you having any concerns with constipation? No  Did patient receive their flu shot (Sept-March)? Yes; NCM updated the patient's chart.     Discharge medications reviewed/discussed/and reconciled against outpatient medications with patient.  Any changes or updates to medications sent to PCP.  Patient Acknowledged     Referrals/orders at D/C:  Referrals/orders placed at D/C? no    Except for Home Health Services mentioned above, have you scheduled these other services? Yes; NCM did review the scheduled tests:   CTA EXAM  Monday Apr 29, 2024 9:30 AM    Laboratory Visit  Monday May 13, 2024 9:00 AM    If yes, have you started these services? no    DME ordered at D/C? No    Discharge orders, AVS reviewed and discussed with patient. Any changes or updates to orders sent to PCP.  Patient Acknowledged      SDOH:   Transportation:    Transportation Needs: No Transportation Needs (3/28/2024)    Transportation Needs     Lack of Transportation: No     Financial Strain:   Financial Resource Strain: Low Risk  (3/28/2024)    Financial Resource Strain     Difficulty of Paying Living Expenses: Not hard at all     Med Affordability: No      Follow up appointments:      Your appointments       Date & Time Appointment Department (Center)    Apr 29, 2024  9:30 AM CDT CTA EXAM with PF CT [facility]    Please arrive 15  minutes prior to your scheduled appointment time.        May 13, 2024  9:00 AM CDT Laboratory Visit with REF BK RD [facility], [address] [place] [address] ([facility] [address])        May 20, 2024 10:30 AM CDT Presurgical Visit with Dressler, Stephanie, PA-C [address] ([place] [address])              [facility]  [address] [facility] [address]  [phone] [facility], [address] [place] [address]  [facility] [address]  [address] [facility] [address]  [facility] [address]  [phone] [place] [address]  [address] [place] [address]  [phone]            TCC  Was TCC ordered: No      PCP (If no TCC appointment)  Does patient already have a PCP appointment scheduled? No  NCM Attempted to schedule PCP office TCM appointment with patient   If no appointment scheduled: Explain The patient declined and would prefer to follow up with specialists at this time.     Specialist    Does the patient have any other follow up appointment(s) needing to be scheduled? Yes    Follow up with Be Melton  Specialty: Cardiac Electrophysiology,  CARDIOLOGY  [facility]  [address] [facility] [address]  [facility] [address] [phone]    If yes: NCM reviewed upcoming specialist appointment with patient: Yes  Does the patient need assistance scheduling appointment(s): No; The patient has already contacted cardiology for scheduling.   The patient has also discussed treatment with oncology. Chemo is scheduled to resume next week after cardiology approval.     Is there any reason as to why you cannot make your appointment(s)?  No     Needs post D/C:   Now that you are home, are there any needs or concerns you need addressed before your next visit with your PCP?  (DME, meds, questions, etc.): No    Interventions by NCM:    Reviewed  all discharge instructions with the patient. All medications were reviewed and educated on the importance of taking the medications as directed.  Patient symptoms were assessed, education was completed for signs/symptoms to monitor, and reviewed when to contact providers vs go to the ED/call 911. Appointments were reviewed and stressed the importance of a close follow up with providers. A TE was sent to the PCP office for an appointment request. The patient received the influenza vaccine this season and this was updated in the patient's records. The patient verbalized understanding and will contact the office with any further questions or concerns.      Overall Rating:   How would you rate the care you received while in the hospital? excellent    CCM referral placed:    No    BOOK BY DATE: 4/10/2024

## 2024-03-28 NOTE — TELEPHONE ENCOUNTER
Spoke to the pt today for TCM. The patient was recently hospitalized for the following:   Discharge Dx:   Tachcyardia  Aortic regurg  Hypokalemia fm GI losses- diarrhea  Hypomagnesemia  Breast cancer on neoadjuant chemo plan for bl mastectomy this summer  Troponin elevation      The pt does not have a HFU appt scheduled at this time and declined scheduling with the NC. The patient would prefer to follow up with specialists at this time.     A TCM/HFU appt is recommended by 4/10/2024 as the pt is a moderate risk for readmission.  Please advise.    BOOK BY DATE (last date for TCM): 4/10/2024      Please f/u with the pt and assist with scheduling a TCM/HFU appt.  Thank you!

## 2024-03-28 NOTE — TELEPHONE ENCOUNTER
Thank you for the update. She should follow up with Stephanie Dressler, PA-C or me as well since she has not been seen since last year. Thank you.

## 2024-03-28 NOTE — TELEPHONE ENCOUNTER
I called the pt. She would like to hold off on making an appointment fir the TCM at this time. She is waiting to hear back from Dr. Melton her cardiologist regarding a sooner appt for a follow up. Her appt currently is scheduled for 5/17. She is sure he is going to want to see her sooner. She has many questions about when she can resume chemo, how long does she stay on the beta blocker and what does it mean as far as surgery for her B/L mastectomy and having a leaky aortic valve. She also has a follow up with her oncologist. I instructed the pt to await directions from cardiology and oncology and call us if she has any questions or concerns. Pt. Verbalized understanding.

## 2024-03-28 NOTE — PAYOR COMM NOTE
--------------  DISCHARGE REVIEW    Payor: [facility]  Subscriber #:  [id number]  Authorization Number: [id number]    Admit date: 3/26/24  Admit time:   5:38 PM  Discharge Date: 3/27/2024  5:04 PM     Admitting Physician: Myriam Buchanan MD  Attending Physician:  No att. providers found  Primary Care Physician: Maegan Petit MD          Discharge Summary Notes        Discharge Summary signed by Myriam Buchanan MD at 3/27/2024  6:17 PM       Author: Myriam Buchanan MD Specialty: HOSPITALIST, Internal Medicine Author Type: Physician    Filed: 3/27/2024  6:17 PM Date of Service: 3/27/2024 12:45 PM Status: Signed    : Myriam Buchanan MD (Physician)           [facility] Internal Medicine Discharge Summary   Patient ID:  Daria Piedra  [id number]  63 year old  7/6/1960    Admit date: 3/26/2024    Discharge date and time: 3/27/2024     Attending Physician: Myriam Buchanan MD     Primary Care Physician: Maegan Petit MD     Admit Dx: Syncope, cardiogenic [R55]      Discharge Diagnosis   tachcyardia- sinus + some NSVT episodes  Aortic regurg  Hypokalemia fm GI losses- diarrhea  Hypomagnesemia  Breast cancer on neoadjuant chemo plan for bl mastectomy this summer  Troponin elevation    Discharged Condition: stable    Disposition: home    Important follow up:  Maegan Petit MD  [address] [place] [address] [id number] [phone]    Schedule an appointment as soon as possible for a visit in 1 week(s)      Italo Yarbrough MD  [address]  [facility] [address]  [phone]    Follow up      Be Melton MD  [address]  [facility] [address]  [phone]              Please refer to prior H&P on admission date.    Hospital Course:    echo q 3 mo due in apr 2024- ordered for here w strain protocol- prior have all had normal LVEF done and EF ok noted aortic regurg- cards f/u  - brenda cards recs- due for ischemic eval- getting a CTA coronary here done and ok  - d/w DR Kumari dc  on toprol 25 for episodes NSVT. EP f/u. Suspect uncontrolled anxiety contributing to sinus tachcyardia  - TSH ok, EKG ok  - K > 4 Mg > 2- repletion protocol ordered  - BB ordered  - tele monitoring  - anxiolytics prn    Day of discharge Exam   Vitals:    03/27/24 1400   BP:    Pulse: 64   Resp:    Temp:        Exam on day of discharge:  Gen: No acute distress  CV: RRR  Lungs: CTAB, good respiratory effort  Abdomen: s/nt/nd  Neuro: no focal deficits      Discharge meds     Medication List        START taking these medications      metoprolol succinate ER 25 MG Tb24  Commonly known as: Toprol XL  Take 1 tablet (25 mg total) by mouth daily.            CONTINUE taking these medications      loperamide 2 MG Caps  Commonly known as: Imodium     TAXOL IV               Where to Get Your Medications        These medications were sent to [id number] - [place], [place] - [address] AT [facility] [address], [phone], [phone] [address], [facility] [address]      Phone: [phone]   metoprolol succinate ER 25 MG Tb24         Consults: IP CONSULT TO CARDIOLOGY    Radiology: XR CHEST AP PORTABLE  (CPT=71045)    Result Date: 3/26/2024         PROCEDURE: XR AP PORTABLE  (CPT=71045) TIME: 1505  COMPARISON: None.  INDICATIONS: Heart palpitations and syncope episode today.  TECHNIQUE:   Single view.   Findings and impression:  Normal heart size.  No edema.  Evelin catheter in the lower SVC.  Normal lung volumes.  Linear scar or atelectasis upper right lung.  Lungs otherwise clear.  Normal pleura.  No free air     Dictated by (CST): Antonio Patel MD on 3/26/2024 at 3:33 PM     Finalized by (CST): Antonio Patel MD on 3/26/2024 at 3:34 PM             Operative Procedures:        Total Time Coordinating Care: > than 30 minutes    Patient had opportunity to ask questions and state understand and agree with therapeutic plan as outlined      Myriam Buchanan MD  [facility] Hospitalist  [phone]  3/27/2024  12:45  PM    Electronically signed by Myriam Buchanan MD on 3/27/2024  6:17 PM         REVIEWER COMMENTS

## 2024-04-29 ENCOUNTER — HOSPITAL ENCOUNTER (OUTPATIENT)
Dept: CT IMAGING | Age: 64
Discharge: HOME | End: 2024-04-29
Payer: COMMERCIAL

## 2024-04-29 DIAGNOSIS — C50.911 MALIGNANT NEOPLASM OF RIGHT FEMALE BREAST, UNSPECIFIED ESTROGEN RECEPTOR STATUS, UNSPECIFIED SITE OF BREAST (HCC): ICD-10-CM

## 2024-04-29 PROCEDURE — 74174 CTA ABD&PLVS W/CONTRAST: CPT

## 2024-05-20 ENCOUNTER — PATIENT MESSAGE (OUTPATIENT)
Dept: FAMILY MEDICINE CLINIC | Facility: CLINIC | Age: 64
End: 2024-05-20

## 2024-05-20 ENCOUNTER — OFFICE VISIT (OUTPATIENT)
Dept: FAMILY MEDICINE CLINIC | Facility: CLINIC | Age: 64
End: 2024-05-20

## 2024-05-20 ENCOUNTER — TELEPHONE (OUTPATIENT)
Dept: FAMILY MEDICINE CLINIC | Facility: CLINIC | Age: 64
End: 2024-05-20

## 2024-05-20 VITALS
DIASTOLIC BLOOD PRESSURE: 72 MMHG | RESPIRATION RATE: 11 BRPM | OXYGEN SATURATION: 97 % | WEIGHT: 141 LBS | HEART RATE: 85 BPM | SYSTOLIC BLOOD PRESSURE: 118 MMHG | BODY MASS INDEX: 22.66 KG/M2 | HEIGHT: 66 IN

## 2024-05-20 DIAGNOSIS — Z01.818 PREOPERATIVE CLEARANCE: Primary | ICD-10-CM

## 2024-05-20 DIAGNOSIS — C50.919 MALIGNANT NEOPLASM OF FEMALE BREAST, UNSPECIFIED ESTROGEN RECEPTOR STATUS, UNSPECIFIED LATERALITY, UNSPECIFIED SITE OF BREAST (HCC): ICD-10-CM

## 2024-05-20 PROBLEM — R93.1 ABNORMAL ECHOCARDIOGRAM: Status: ACTIVE | Noted: 2024-03-29

## 2024-05-20 PROBLEM — R55 SYNCOPE: Status: RESOLVED | Noted: 2024-03-26 | Resolved: 2024-05-20

## 2024-05-20 PROBLEM — D12.5 BENIGN NEOPLASM OF SIGMOID COLON: Status: RESOLVED | Noted: 2023-05-12 | Resolved: 2024-05-20

## 2024-05-20 PROBLEM — I47.29 NSVT (NONSUSTAINED VENTRICULAR TACHYCARDIA) (HCC): Status: ACTIVE | Noted: 2024-03-29

## 2024-05-20 PROBLEM — R55 SYNCOPE: Status: ACTIVE | Noted: 2024-03-26

## 2024-05-20 PROBLEM — R00.0 SINUS TACHYCARDIA: Status: ACTIVE | Noted: 2024-03-29

## 2024-05-20 PROBLEM — R63.8 DECREASED ORAL INTAKE: Status: RESOLVED | Noted: 2023-12-20 | Resolved: 2024-05-20

## 2024-05-20 PROBLEM — Z87.19 HISTORY OF DUODENAL ULCER: Status: ACTIVE | Noted: 2024-05-20

## 2024-05-20 PROBLEM — R63.8 DECREASED ORAL INTAKE: Status: ACTIVE | Noted: 2023-12-20

## 2024-05-20 RX ORDER — METOPROLOL SUCCINATE 50 MG/1
TABLET, EXTENDED RELEASE ORAL
COMMUNITY
Start: 2024-05-09

## 2024-05-20 NOTE — H&P
Daria Piedra is a 63 year old female who presents for a pre-operative physical exam. Patient is to have RIGHT BREAST MASTECTOMY, SENTINEL LYMPH NODE BIOPSY RIGHT BREAST AND POSSIBLE AXILLARY LYMPH NODE DISSECTION, LEFT BREAST MASTECTOMY ([place]) Immediate breast reconstruction with placement of bilateral breast tissue expanders and acellular dermal matrix (GILLES)  to be done by Jill Sena and [place] at [place] on 6/5/24..      HPI:   Preoperative clearance requested by Dr. Sena for comorbid medical conditions.   No prior anesthesia reaction.  No family history of severe anesthesia reaction  No hx of PE or DVT.   Dr. Italo Yarbrough - [facility] - patient obtain clearance from them but it is not a formal letter.  It was a response to a [employer] message that the nurse answered with the response from the doctor.  Will obtain a formal clearance letter from them.  She did provide a note from her oncologist that says \"okay from oncology standpoint to undergo bilateral mastectomy as planned.\"    ALT and AST elevation attributed to chemo per patient.  Currently AST is at 47 and ALT is 48.  Hemoglobin improved per patient.  Hemoglobin from 5/13/2024 is 10.9.  EKG from 4/16/2024 from [facility]: \"Normal sinus rhythm.  Normal EKG.\"    Infusion on hold until after surgery.         Current Outpatient Medications   Medication Sig Dispense Refill    metoprolol succinate ER 50 MG Oral Tablet 24 Hr         Allergies: No Known Allergies   Past Medical History:    Arrhythmia    s/p chemotherapy-doxorubicin; started on medication metoprolol    Body piercing    Ears    BRCA gene mutation negative in female    Negative 84 gene hereditary cancer panel, report in media tab    Breast cyst    Cancer (HCC)    Right breast    History of blood transfusion    History of stomach ulcers    H. Pylori    Personal history of antineoplastic chemotherapy    Screening for other and unspecified genitourinary condition    Visual impairment     Contacts/Glasses    Wears glasses      Past Surgical History:   Procedure Laterality Date    Benign biopsy left  2013    stereotactic          x4    Cholecystectomy  2018    Colonoscopy  2010    complete, repeat 5 years    Colonoscopy      Cyst aspiration left  8 years ago?    [facility]      Other surgical history  2012    MRI guided core breast biopsy left breast 3:00 and at the central region by Dr. Brooks at [facility]    Other surgical history      insertion of power port    Upper gi endoscopy,exam  2011    EGD      Family History   Problem Relation Age of Onset    Heart Disorder Father     Other (Pulmonary fibrosis) Father     Other (Leukemia) Mother     Colon Cancer Son         \"precancerous anal\"    Cancer Son         skin    Diabetes Brother     Colon Polyps Brother     Colon Cancer Brother         pre-cancer cells removed    Heart Disorder Brother     Other (Benign tubular adenoma of the large intestine) Brother     Diabetes Brother     Diabetes Other     Colon Cancer Son         pre cancer anal removed      Social History:   Social History     Socioeconomic History    Marital status:    Tobacco Use    Smoking status: Never    Smokeless tobacco: Never   Vaping Use    Vaping status: Never Used   Substance and Sexual Activity    Alcohol use: Not Currently     Comment: Occasional glass of wine or beer if dinner out    Drug use: Never   Other Topics Concern    Caffeine Concern Yes     Comment: 1 q day    Stress Concern No    Weight Concern No    Special Diet No    Exercise Yes     Comment: 30 minutes on threadmill daily    Seat Belt Yes     Social Determinants of Health     Financial Resource Strain: Low Risk  (3/28/2024)    Financial Resource Strain     Difficulty of Paying Living Expenses: Not hard at all     Med Affordability: No   Food Insecurity: No Food Insecurity (3/26/2024)    Food Insecurity     Food Insecurity: Never true   Transportation Needs: No Transportation  Needs (3/28/2024)    Transportation Needs     Lack of Transportation: No   Housing Stability: Low Risk  (3/26/2024)    Housing Stability     Housing Instability: No         REVIEW OF SYSTEMS:   GENERAL: feels well otherwise  SKIN: denies any unusual skin lesions  EYES:denies blurred vision or double vision  HEENT: denies nasal congestion, sinus pain or ST  LUNGS: denies shortness of breath with exertion  CARDIOVASCULAR: denies chest pain on exertion  GI: denies abdominal pain,denies heartburn  : denies dysuria  MUSCULOSKELETAL: denies back pain  NEURO: denies headaches  PSYCHE: denies depression or anxiety  HEMATOLOGIC:  as above  ENDOCRINE: denies thyroid history  ALL/ASTHMA: denies hx of asthma    EXAM:   /72 (BP Location: Left arm, Patient Position: Sitting, Cuff Size: adult)   Pulse 85   Resp 11   Ht 5' 6\" (1.676 m)   Wt 141 lb (64 kg)   LMP 12/21/2008 (Approximate)   SpO2 97%   BMI 22.76 kg/m²   GENERAL: well developed, well nourished,in no apparent distress  SKIN: no rashes,no suspicious lesions  HEENT: atraumatic, normocephalic,ears and throat are clear  EYES:PERRLA, EOMI,conjunctiva are clear  NECK: supple,no adenopathy,no bruits  CHEST: port left upper chest  LUNGS: clear to auscultation  CARDIO: RRR without murmur  GI: no masses, HSM or tenderness  EXTREMITIES: no cyanosis, clubbing or edema  NEURO: Oriented times three,cranial nerves are intact,motor and sensory are grossly intact    ASSESSMENT AND PLAN:   Daria Piedra is a 63 year old female who presents for a pre-operative physical exam. Patient is to have RIGHT BREAST MASTECTOMY, SENTINEL LYMPH NODE BIOPSY RIGHT BREAST AND POSSIBLE AXILLARY LYMPH NODE DISSECTION, LEFT BREAST MASTECTOMY ([place]) Immediate breast reconstruction with placement of bilateral breast tissue expanders and acellular dermal matrix (GILLES)  to be done by Jill Sena and [place] at [facility] on 6/5/24..     Pt has the following conditions:  Patient Active Problem  List   Diagnosis    Other B-complex deficiencies    Unspecified vitamin D deficiency    Sclerosing adenosis of breast    History of adenomatous polyp of colon    BRCA gene mutation negative in female    Anemia, unspecified    Low grade myelodysplastic syndrome lesions (HCC)    Abnormal echocardiogram    NSVT (nonsustained ventricular tachycardia) (HCC)    Sinus tachycardia    History of duodenal ulcer       Patient is in satisfactory condition and acceptable risk for planned surgery and anesthesia pending review of hemoglobin by surgical team - hemoglobin 10.9.  We have a MyChart message addressed to patient from cardiology nurse indicating that Dr. Yarbrough is ok with cardiology clearance - will reach out for formal clearance in the form of a letter.  See note from her oncologist with note regarding clearance - \"Ok from oncology standpoint to undergo b/l mastectomy as planned\".      This consult was sent back the referring physician, Dr. Sena

## 2024-05-20 NOTE — H&P (VIEW-ONLY)
Daria Piedra is a 63 year old female who presents for a pre-operative physical exam. Patient is to have RIGHT BREAST MASTECTOMY, SENTINEL LYMPH NODE BIOPSY RIGHT BREAST AND POSSIBLE AXILLARY LYMPH NODE DISSECTION, LEFT BREAST MASTECTOMY ([place]) Immediate breast reconstruction with placement of bilateral breast tissue expanders and acellular dermal matrix (GILLES)  to be done by Jill Sena and [place] at [place] on 6/5/24..      HPI:   Preoperative clearance requested by Dr. Sena for comorbid medical conditions.   No prior anesthesia reaction.  No family history of severe anesthesia reaction  No hx of PE or DVT.   Dr. Italo Yarbrough - [facility] - patient obtain clearance from them but it is not a formal letter.  It was a response to a [employer] message that the nurse answered with the response from the doctor.  Will obtain a formal clearance letter from them.  She did provide a note from her oncologist that says \"okay from oncology standpoint to undergo bilateral mastectomy as planned.\"    ALT and AST elevation attributed to chemo per patient.  Currently AST is at 47 and ALT is 48.  Hemoglobin improved per patient.  Hemoglobin from 5/13/2024 is 10.9.  EKG from 4/16/2024 from [facility]: \"Normal sinus rhythm.  Normal EKG.\"    Infusion on hold until after surgery.         Current Outpatient Medications   Medication Sig Dispense Refill    metoprolol succinate ER 50 MG Oral Tablet 24 Hr         Allergies: No Known Allergies   Past Medical History:    Arrhythmia    s/p chemotherapy-doxorubicin; started on medication metoprolol    Body piercing    Ears    BRCA gene mutation negative in female    Negative 84 gene hereditary cancer panel, report in media tab    Breast cyst    Cancer (HCC)    Right breast    History of blood transfusion    History of stomach ulcers    H. Pylori    Personal history of antineoplastic chemotherapy    Screening for other and unspecified genitourinary condition    Visual impairment     Contacts/Glasses    Wears glasses      Past Surgical History:   Procedure Laterality Date    Benign biopsy left  2013    stereotactic          x4    Cholecystectomy  2018    Colonoscopy  2010    complete, repeat 5 years    Colonoscopy      Cyst aspiration left  8 years ago?    Egd      Other surgical history  2012    MRI guided core breast biopsy left breast 3:00 and at the central region by Dr. Brooks at [facility]    Other surgical history      insertion of power port    Upper gi endoscopy,exam  2011    EGD      Family History   Problem Relation Age of Onset    Heart Disorder Father     Other (Pulmonary fibrosis) Father     Other (Leukemia) Mother     Colon Cancer Son         \"precancerous anal\"    Cancer Son         skin    Diabetes Brother     Colon Polyps Brother     Colon Cancer Brother         pre-cancer cells removed    Heart Disorder Brother     Other (Benign tubular adenoma of the large intestine) Brother     Diabetes Brother     Diabetes Other     Colon Cancer Son         pre cancer anal removed      Social History:   Social History     Socioeconomic History    Marital status:    Tobacco Use    Smoking status: Never    Smokeless tobacco: Never   Vaping Use    Vaping status: Never Used   Substance and Sexual Activity    Alcohol use: Not Currently     Comment: Occasional glass of wine or beer if dinner out    Drug use: Never   Other Topics Concern    Caffeine Concern Yes     Comment: 1 q day    Stress Concern No    Weight Concern No    Special Diet No    Exercise Yes     Comment: 30 minutes on threadmill daily    Seat Belt Yes     Social Determinants of Health     Financial Resource Strain: Low Risk  (3/28/2024)    Financial Resource Strain     Difficulty of Paying Living Expenses: Not hard at all     Med Affordability: No   Food Insecurity: No Food Insecurity (3/26/2024)    Food Insecurity     Food Insecurity: Never true   Transportation Needs: No Transportation  Needs (3/28/2024)    Transportation Needs     Lack of Transportation: No   Housing Stability: Low Risk  (3/26/2024)    Housing Stability     Housing Instability: No         REVIEW OF SYSTEMS:   GENERAL: feels well otherwise  SKIN: denies any unusual skin lesions  EYES:denies blurred vision or double vision  HEENT: denies nasal congestion, sinus pain or ST  LUNGS: denies shortness of breath with exertion  CARDIOVASCULAR: denies chest pain on exertion  GI: denies abdominal pain,denies heartburn  : denies dysuria  MUSCULOSKELETAL: denies back pain  NEURO: denies headaches  PSYCHE: denies depression or anxiety  HEMATOLOGIC:  as above  ENDOCRINE: denies thyroid history  ALL/ASTHMA: denies hx of asthma    EXAM:   /72 (BP Location: Left arm, Patient Position: Sitting, Cuff Size: adult)   Pulse 85   Resp 11   Ht 5' 6\" (1.676 m)   Wt 141 lb (64 kg)   LMP 12/21/2008 (Approximate)   SpO2 97%   BMI 22.76 kg/m²   GENERAL: well developed, well nourished,in no apparent distress  SKIN: no rashes,no suspicious lesions  HEENT: atraumatic, normocephalic,ears and throat are clear  EYES:PERRLA, EOMI,conjunctiva are clear  NECK: supple,no adenopathy,no bruits  CHEST: port left upper chest  LUNGS: clear to auscultation  CARDIO: RRR without murmur  GI: no masses, HSM or tenderness  EXTREMITIES: no cyanosis, clubbing or edema  NEURO: Oriented times three,cranial nerves are intact,motor and sensory are grossly intact    ASSESSMENT AND PLAN:   Daria Piedra is a 63 year old female who presents for a pre-operative physical exam. Patient is to have RIGHT BREAST MASTECTOMY, SENTINEL LYMPH NODE BIOPSY RIGHT BREAST AND POSSIBLE AXILLARY LYMPH NODE DISSECTION, LEFT BREAST MASTECTOMY ([place]) Immediate breast reconstruction with placement of bilateral breast tissue expanders and acellular dermal matrix (GILLES)  to be done by Jill Sena and [place] at [facility] on 6/5/24..     Pt has the following conditions:  Patient Active Problem  List   Diagnosis    Other B-complex deficiencies    Unspecified vitamin D deficiency    Sclerosing adenosis of breast    History of adenomatous polyp of colon    BRCA gene mutation negative in female    Anemia, unspecified    Low grade myelodysplastic syndrome lesions (HCC)    Abnormal echocardiogram    NSVT (nonsustained ventricular tachycardia) (HCC)    Sinus tachycardia    History of duodenal ulcer       Patient is in satisfactory condition and acceptable risk for planned surgery and anesthesia pending review of hemoglobin by surgical team - hemoglobin 10.9.  We have a MyChart message addressed to patient from cardiology nurse indicating that Dr. Yarbrough is ok with cardiology clearance - will reach out for formal clearance in the form of a letter.  See note from her oncologist with note regarding clearance - \"Ok from oncology standpoint to undergo b/l mastectomy as planned\".      This consult was sent back the referring physician, Dr. Sena

## 2024-05-21 ENCOUNTER — TELEPHONE (OUTPATIENT)
Dept: FAMILY MEDICINE CLINIC | Facility: CLINIC | Age: 64
End: 2024-05-21

## 2024-05-21 NOTE — TELEPHONE ENCOUNTER
Nothing yet.  If patient has a copy of the letter, she could attach it to a My [employer] message to send to us.

## 2024-05-24 ENCOUNTER — TELEPHONE (OUTPATIENT)
Dept: MAMMOGRAPHY | Facility: HOSPITAL | Age: 64
End: 2024-05-24

## 2024-05-24 NOTE — TELEPHONE ENCOUNTER
Spoke with Daria Piedra regarding [place] Lymph Node mapping which will be done in nuclear medicine department before mastectomy surgery scheduled for 6-05-24. Procedure explained and all questions answered. Verbal education about lymphedema given.  Breast Care Coordinator to provide written information regarding mastectomy surgery, breast cancer and lymphedema. Pt verbalized understanding and had no further questions at this time.

## 2024-05-29 ENCOUNTER — TELEPHONE (OUTPATIENT)
Dept: SURGERY | Facility: CLINIC | Age: 64
End: 2024-05-29

## 2024-05-29 NOTE — TELEPHONE ENCOUNTER
Joaquina WELLS from [facility] called and [facility] stating the per anesthesia Pt is good to go for surgery 06/05/24.  [phone]

## 2024-06-04 ENCOUNTER — ANESTHESIA EVENT (OUTPATIENT)
Dept: SURGERY | Facility: HOSPITAL | Age: 64
End: 2024-06-04
Payer: COMMERCIAL

## 2024-06-05 ENCOUNTER — HOSPITAL ENCOUNTER (OUTPATIENT)
Facility: HOSPITAL | Age: 64
Discharge: HOME | End: 2024-06-05
Attending: SURGERY | Admitting: SURGERY
Payer: COMMERCIAL

## 2024-06-05 ENCOUNTER — ANESTHESIA (OUTPATIENT)
Dept: SURGERY | Facility: HOSPITAL | Age: 64
End: 2024-06-05
Payer: COMMERCIAL

## 2024-06-05 ENCOUNTER — HOSPITAL ENCOUNTER (OUTPATIENT)
Dept: NUCLEAR MEDICINE | Facility: HOSPITAL | Age: 64
Discharge: HOME | End: 2024-06-05
Attending: SURGERY
Payer: COMMERCIAL

## 2024-06-05 VITALS
SYSTOLIC BLOOD PRESSURE: 127 MMHG | OXYGEN SATURATION: 96 % | HEART RATE: 99 BPM | HEIGHT: 66 IN | RESPIRATION RATE: 16 BRPM | WEIGHT: 141 LBS | TEMPERATURE: 97 F | BODY MASS INDEX: 22.66 KG/M2 | DIASTOLIC BLOOD PRESSURE: 83 MMHG

## 2024-06-05 DIAGNOSIS — C50.911 MALIGNANT NEOPLASM OF RIGHT FEMALE BREAST, UNSPECIFIED ESTROGEN RECEPTOR STATUS, UNSPECIFIED SITE OF BREAST (HCC): Primary | ICD-10-CM

## 2024-06-05 DIAGNOSIS — C50.911 BREAST CANCER, RIGHT (HCC): ICD-10-CM

## 2024-06-05 PROCEDURE — 88331 PATH CONSLTJ SURG 1 BLK 1SPC: CPT | Performed by: SURGERY

## 2024-06-05 PROCEDURE — 88342 IMHCHEM/IMCYTCHM 1ST ANTB: CPT | Performed by: SURGERY

## 2024-06-05 PROCEDURE — 0HHV0NZ INSERTION OF TISSUE EXPANDER INTO BILATERAL BREAST, OPEN APPROACH: ICD-10-PCS | Performed by: SURGERY

## 2024-06-05 PROCEDURE — 76942 ECHO GUIDE FOR BIOPSY: CPT | Performed by: ANESTHESIOLOGY

## 2024-06-05 PROCEDURE — 78195 LYMPH SYSTEM IMAGING: CPT | Performed by: SURGERY

## 2024-06-05 PROCEDURE — 0HBV0ZZ EXCISION OF BILATERAL BREAST, OPEN APPROACH: ICD-10-PCS | Performed by: SURGERY

## 2024-06-05 PROCEDURE — 07B50ZX EXCISION OF RIGHT AXILLARY LYMPHATIC, OPEN APPROACH, DIAGNOSTIC: ICD-10-PCS | Performed by: SURGERY

## 2024-06-05 PROCEDURE — 88307 TISSUE EXAM BY PATHOLOGIST: CPT | Performed by: SURGERY

## 2024-06-05 DEVICE — GRAFT HUM TISS THK2-2.8MM THCK L PERF CNTOUR: Type: IMPLANTABLE DEVICE | Site: BREAST | Status: FUNCTIONAL

## 2024-06-05 DEVICE — NATRELLE TE SMOOTH 133S-MX-13-T (US)
Type: IMPLANTABLE DEVICE | Site: BREAST | Status: FUNCTIONAL
Brand: NATRELLE 133S TISSUE EXPANDERS

## 2024-06-05 RX ORDER — HYDROMORPHONE HYDROCHLORIDE 1 MG/ML
0.2 INJECTION, SOLUTION INTRAMUSCULAR; INTRAVENOUS; SUBCUTANEOUS EVERY 5 MIN PRN
Status: DISCONTINUED | OUTPATIENT
Start: 2024-06-05 | End: 2024-06-06

## 2024-06-05 RX ORDER — HYDROMORPHONE HYDROCHLORIDE 1 MG/ML
0.4 INJECTION, SOLUTION INTRAMUSCULAR; INTRAVENOUS; SUBCUTANEOUS EVERY 5 MIN PRN
Status: DISCONTINUED | OUTPATIENT
Start: 2024-06-05 | End: 2024-06-06

## 2024-06-05 RX ORDER — ACETAMINOPHEN 500 MG
1000 TABLET ORAL ONCE AS NEEDED
Status: COMPLETED | OUTPATIENT
Start: 2024-06-05 | End: 2024-06-05

## 2024-06-05 RX ORDER — DOCUSATE SODIUM 100 MG/1
100 CAPSULE, LIQUID FILLED ORAL 2 TIMES DAILY
Qty: 30 CAPSULE | Refills: 1 | Status: SHIPPED | OUTPATIENT
Start: 2024-06-05

## 2024-06-05 RX ORDER — DIPHENHYDRAMINE HYDROCHLORIDE 50 MG/ML
INJECTION INTRAMUSCULAR; INTRAVENOUS
Status: COMPLETED
Start: 2024-06-05 | End: 2024-06-05

## 2024-06-05 RX ORDER — HYDROMORPHONE HYDROCHLORIDE 1 MG/ML
INJECTION, SOLUTION INTRAMUSCULAR; INTRAVENOUS; SUBCUTANEOUS
Status: COMPLETED
Start: 2024-06-05 | End: 2024-06-05

## 2024-06-05 RX ORDER — ONDANSETRON 2 MG/ML
INJECTION INTRAMUSCULAR; INTRAVENOUS AS NEEDED
Status: DISCONTINUED | OUTPATIENT
Start: 2024-06-05 | End: 2024-06-05 | Stop reason: SURG

## 2024-06-05 RX ORDER — HYDROMORPHONE HYDROCHLORIDE 1 MG/ML
0.6 INJECTION, SOLUTION INTRAMUSCULAR; INTRAVENOUS; SUBCUTANEOUS EVERY 5 MIN PRN
Status: DISCONTINUED | OUTPATIENT
Start: 2024-06-05 | End: 2024-06-06

## 2024-06-05 RX ORDER — NEOSTIGMINE METHYLSULFATE 1 MG/ML
INJECTION, SOLUTION INTRAVENOUS AS NEEDED
Status: DISCONTINUED | OUTPATIENT
Start: 2024-06-05 | End: 2024-06-05 | Stop reason: SURG

## 2024-06-05 RX ORDER — MIDAZOLAM HYDROCHLORIDE 1 MG/ML
INJECTION INTRAMUSCULAR; INTRAVENOUS AS NEEDED
Status: DISCONTINUED | OUTPATIENT
Start: 2024-06-05 | End: 2024-06-05 | Stop reason: SURG

## 2024-06-05 RX ORDER — METOCLOPRAMIDE HYDROCHLORIDE 5 MG/ML
10 INJECTION INTRAMUSCULAR; INTRAVENOUS EVERY 8 HOURS PRN
Status: DISCONTINUED | OUTPATIENT
Start: 2024-06-05 | End: 2024-06-06

## 2024-06-05 RX ORDER — HYDROCODONE BITARTRATE AND ACETAMINOPHEN 5; 325 MG/1; MG/1
1-2 TABLET ORAL EVERY 4 HOURS PRN
Qty: 40 TABLET | Refills: 0 | Status: SHIPPED | OUTPATIENT
Start: 2024-06-05

## 2024-06-05 RX ORDER — ROCURONIUM BROMIDE 10 MG/ML
INJECTION, SOLUTION INTRAVENOUS AS NEEDED
Status: DISCONTINUED | OUTPATIENT
Start: 2024-06-05 | End: 2024-06-05 | Stop reason: SURG

## 2024-06-05 RX ORDER — NALOXONE HYDROCHLORIDE 0.4 MG/ML
0.08 INJECTION, SOLUTION INTRAMUSCULAR; INTRAVENOUS; SUBCUTANEOUS AS NEEDED
Status: DISCONTINUED | OUTPATIENT
Start: 2024-06-05 | End: 2024-06-06

## 2024-06-05 RX ORDER — MIDAZOLAM HYDROCHLORIDE 1 MG/ML
1 INJECTION INTRAMUSCULAR; INTRAVENOUS EVERY 5 MIN PRN
Status: DISCONTINUED | OUTPATIENT
Start: 2024-06-05 | End: 2024-06-06

## 2024-06-05 RX ORDER — ACETAMINOPHEN 500 MG
1000 TABLET ORAL ONCE
Status: DISCONTINUED | OUTPATIENT
Start: 2024-06-05 | End: 2024-06-05 | Stop reason: HOSPADM

## 2024-06-05 RX ORDER — ONDANSETRON 4 MG/1
4 TABLET, FILM COATED ORAL EVERY 8 HOURS PRN
Qty: 12 TABLET | Refills: 0 | Status: SHIPPED | OUTPATIENT
Start: 2024-06-05

## 2024-06-05 RX ORDER — SODIUM CHLORIDE, SODIUM LACTATE, POTASSIUM CHLORIDE, CALCIUM CHLORIDE 600; 310; 30; 20 MG/100ML; MG/100ML; MG/100ML; MG/100ML
INJECTION, SOLUTION INTRAVENOUS CONTINUOUS
Status: DISCONTINUED | OUTPATIENT
Start: 2024-06-05 | End: 2024-06-06

## 2024-06-05 RX ORDER — DIPHENHYDRAMINE HYDROCHLORIDE 50 MG/ML
12.5 INJECTION INTRAMUSCULAR; INTRAVENOUS ONCE
Status: COMPLETED | OUTPATIENT
Start: 2024-06-05 | End: 2024-06-05

## 2024-06-05 RX ORDER — CEPHALEXIN 500 MG/1
500 CAPSULE ORAL 4 TIMES DAILY
Qty: 40 CAPSULE | Refills: 1 | Status: SHIPPED | OUTPATIENT
Start: 2024-06-05

## 2024-06-05 RX ORDER — MEPERIDINE HYDROCHLORIDE 25 MG/ML
12.5 INJECTION INTRAMUSCULAR; INTRAVENOUS; SUBCUTANEOUS AS NEEDED
Status: DISCONTINUED | OUTPATIENT
Start: 2024-06-05 | End: 2024-06-06

## 2024-06-05 RX ORDER — DEXAMETHASONE SODIUM PHOSPHATE 10 MG/ML
INJECTION, SOLUTION INTRAMUSCULAR; INTRAVENOUS AS NEEDED
Status: DISCONTINUED | OUTPATIENT
Start: 2024-06-05 | End: 2024-06-05 | Stop reason: SURG

## 2024-06-05 RX ORDER — DEXAMETHASONE SODIUM PHOSPHATE 4 MG/ML
VIAL (ML) INJECTION AS NEEDED
Status: DISCONTINUED | OUTPATIENT
Start: 2024-06-05 | End: 2024-06-05 | Stop reason: SURG

## 2024-06-05 RX ORDER — LABETALOL HYDROCHLORIDE 5 MG/ML
5 INJECTION, SOLUTION INTRAVENOUS EVERY 5 MIN PRN
Status: DISCONTINUED | OUTPATIENT
Start: 2024-06-05 | End: 2024-06-06

## 2024-06-05 RX ORDER — LIDOCAINE AND PRILOCAINE 25; 25 MG/G; MG/G
CREAM TOPICAL ONCE
Status: COMPLETED | OUTPATIENT
Start: 2024-06-05 | End: 2024-06-05

## 2024-06-05 RX ORDER — ONDANSETRON 2 MG/ML
4 INJECTION INTRAMUSCULAR; INTRAVENOUS EVERY 6 HOURS PRN
Status: DISCONTINUED | OUTPATIENT
Start: 2024-06-05 | End: 2024-06-06

## 2024-06-05 RX ORDER — HYDROCODONE BITARTRATE AND ACETAMINOPHEN 5; 325 MG/1; MG/1
1 TABLET ORAL ONCE AS NEEDED
Status: COMPLETED | OUTPATIENT
Start: 2024-06-05 | End: 2024-06-05

## 2024-06-05 RX ORDER — BUPIVACAINE HYDROCHLORIDE 2.5 MG/ML
INJECTION, SOLUTION EPIDURAL; INFILTRATION; INTRACAUDAL AS NEEDED
Status: DISCONTINUED | OUTPATIENT
Start: 2024-06-05 | End: 2024-06-05 | Stop reason: SURG

## 2024-06-05 RX ORDER — LIDOCAINE HYDROCHLORIDE 10 MG/ML
INJECTION, SOLUTION EPIDURAL; INFILTRATION; INTRACAUDAL; PERINEURAL AS NEEDED
Status: DISCONTINUED | OUTPATIENT
Start: 2024-06-05 | End: 2024-06-05 | Stop reason: SURG

## 2024-06-05 RX ORDER — DIAZEPAM 5 MG/1
5 TABLET ORAL AS NEEDED
Status: DISCONTINUED | OUTPATIENT
Start: 2024-06-05 | End: 2024-06-05 | Stop reason: HOSPADM

## 2024-06-05 RX ORDER — HYDROCODONE BITARTRATE AND ACETAMINOPHEN 5; 325 MG/1; MG/1
2 TABLET ORAL ONCE AS NEEDED
Status: COMPLETED | OUTPATIENT
Start: 2024-06-05 | End: 2024-06-05

## 2024-06-05 RX ORDER — GLYCOPYRROLATE 0.2 MG/ML
INJECTION, SOLUTION INTRAMUSCULAR; INTRAVENOUS AS NEEDED
Status: DISCONTINUED | OUTPATIENT
Start: 2024-06-05 | End: 2024-06-05 | Stop reason: SURG

## 2024-06-05 RX ADMIN — GLYCOPYRROLATE 0.6 MG: 0.2 INJECTION, SOLUTION INTRAMUSCULAR; INTRAVENOUS at 15:45:00

## 2024-06-05 RX ADMIN — DEXAMETHASONE SODIUM PHOSPHATE 2 MG: 10 INJECTION, SOLUTION INTRAMUSCULAR; INTRAVENOUS at 13:05:00

## 2024-06-05 RX ADMIN — BUPIVACAINE HYDROCHLORIDE 25 ML: 2.5 INJECTION, SOLUTION EPIDURAL; INFILTRATION; INTRACAUDAL at 13:05:00

## 2024-06-05 RX ADMIN — DEXAMETHASONE SODIUM PHOSPHATE 8 MG: 4 MG/ML VIAL (ML) INJECTION at 13:18:00

## 2024-06-05 RX ADMIN — LIDOCAINE HYDROCHLORIDE 30 MG: 10 INJECTION, SOLUTION EPIDURAL; INFILTRATION; INTRACAUDAL; PERINEURAL at 12:57:00

## 2024-06-05 RX ADMIN — SODIUM CHLORIDE, SODIUM LACTATE, POTASSIUM CHLORIDE, CALCIUM CHLORIDE: 600; 310; 30; 20 INJECTION, SOLUTION INTRAVENOUS at 16:05:00

## 2024-06-05 RX ADMIN — ROCURONIUM BROMIDE 50 MG: 10 INJECTION, SOLUTION INTRAVENOUS at 12:57:00

## 2024-06-05 RX ADMIN — MIDAZOLAM HYDROCHLORIDE 2 MG: 1 INJECTION INTRAMUSCULAR; INTRAVENOUS at 12:52:00

## 2024-06-05 RX ADMIN — SODIUM CHLORIDE, SODIUM LACTATE, POTASSIUM CHLORIDE, CALCIUM CHLORIDE: 600; 310; 30; 20 INJECTION, SOLUTION INTRAVENOUS at 12:52:00

## 2024-06-05 RX ADMIN — DEXAMETHASONE SODIUM PHOSPHATE 2 MG: 10 INJECTION, SOLUTION INTRAMUSCULAR; INTRAVENOUS at 13:07:00

## 2024-06-05 RX ADMIN — BUPIVACAINE HYDROCHLORIDE 25 ML: 2.5 INJECTION, SOLUTION EPIDURAL; INFILTRATION; INTRACAUDAL at 13:07:00

## 2024-06-05 RX ADMIN — ONDANSETRON 4 MG: 2 INJECTION INTRAMUSCULAR; INTRAVENOUS at 15:23:00

## 2024-06-05 RX ADMIN — NEOSTIGMINE METHYLSULFATE 3 MG: 1 INJECTION, SOLUTION INTRAVENOUS at 15:45:00

## 2024-06-05 NOTE — ANESTHESIA PROCEDURE NOTES
Airway  Date/Time: 6/5/2024 12:58 PM  Urgency: elective    Airway not difficult    General Information and Staff    Patient location during procedure: OR  Anesthesiologist: Mark Rey MD  Performed: anesthesiologist   Performed by: Mark Rey MD  Authorized by: Mark Rey MD      Indications and Patient Condition  Indications for airway management: anesthesia  Sedation level: deep  Preoxygenated: yes  Patient position: sniffing  Mask difficulty assessment: 1 - vent by mask    Final Airway Details  Final airway type: endotracheal airway      Successful airway: ETT  Cuffed: yes   Successful intubation technique: direct laryngoscopy  Endotracheal tube insertion site: oral  Blade: Amado  Blade size: #3  ETT size (mm): 7.5    Cormack-Lehane Classification: grade IIA - partial view of glottis  Placement verified by: capnometry   Measured from: teeth  ETT to teeth (cm): 20  Number of attempts at approach: 1

## 2024-06-05 NOTE — PROGRESS NOTES
Plan for bilateral SS-mastectomy by Dr. Espitia and immediate reconstruction with tissue expander and acellular dermal matrix reviewed with patient. The risks of surgery including but not limited to bleeding, infection, scarring, delayed wound healing, seroma, asymmetry, implant infection/extrusion requiring removal, expander deflation, injury to adjacent structures, capsular contracture, ALCL, and need for further surgery were reviewed. The expected post-operative course was discussed. Questions were answered to the patient's satisfaction. No guarantees as to outcome were offered. The patient expresses understanding and wishes to proceed.

## 2024-06-05 NOTE — ANESTHESIA POSTPROCEDURE EVALUATION
[facility]    Daria Piedra Patient Status:  Outpatient in a Bed   Age/Gender 63 year old female MRN [id number]   Location [facility] POST ANESTHESIA CARE UNIT Attending Khurram Espitia MD   Hosp Day # 0 PCP Maegan Petit MD       Anesthesia Post-op Note    RIGHT BREAST MASTECTOMY, SENTINEL LYMPH NODE BIOPSY RIGHT BREAST AND LEFT BREAST MASTECTOMY ([place]) Immediate breast reconstruction with placement of bilateral breast tissue expanders and acellular dermal matrix (GILLES)    Procedure Summary       Date: 06/05/24 Room / Location:  MAIN OR 03 / [facility] MAIN OR    Anesthesia Start: 1252 Anesthesia Stop: 1605    Procedures:       RIGHT BREAST MASTECTOMY, SENTINEL LYMPH NODE BIOPSY RIGHT BREAST AND LEFT BREAST MASTECTOMY ([place]) Immediate breast reconstruction with placement of bilateral breast tissue expanders and acellular dermal matrix (GILLES) (Bilateral: Breast)      . (Bilateral: Breast) Diagnosis: (RIGHT BREAST CANCER)    Surgeons: Khurram Espitia MD; Jeff Sena MD Anesthesiologist: Mark Rey MD    Anesthesia Type: general ASA Status: Not recorded            Anesthesia Type: general    Vitals Value Taken Time   /78 06/05/24 1606   Temp 97.4 °F (36.3 °C) 06/05/24 1606   Pulse 79 06/05/24 1611   Resp 15 06/05/24 1611   SpO2 95 % 06/05/24 1611   Vitals shown include unfiled device data.    Patient Location: PACU    Anesthesia Type: general    Airway Patency: extubated    Postop Pain Control: adequate    Mental Status: mildly sedated but able to meaningfully participate in the post-anesthesia evaluation    Nausea/Vomiting: none    Cardiopulmonary/Hydration status: stable euvolemic    Complications: no apparent anesthesia related complications    Postop vital signs: stable    Dental Exam: Unchanged from Preop

## 2024-06-05 NOTE — DISCHARGE INSTRUCTIONS
Plastic Surgery Home Care Instructions      We hope you were pleased with your care at [facility].  We wish you the best outcome and overall experience with your operation.  These instructions will help to minimize pain, optimize healing, and improve the likelihood of a successful result.    What To Expect  There will be some spotting of the incision lines for the next few days.  Your breasts will be swollen and might feel congested for the next 1-2 weeks  Please DO NOT apply heat or ice to the affected area  Temporary areas of numbness are typical in the early weeks following a breast procedure.  Normalization of sensation can typically take up to several months following the operation.    Bandages (Dressing)  Keep dressings clean and dry  Do not remove your bra unless for hygiene purposes - compression is key - especially at night. You can change to a supportive sports bra or camilsole if this provides good compression and you are more comfortable in that rather than the surgical bra. No underwire.   You are to wear your bra 24/7 for 6 weeks post-operatively.   Reinforce the dressing with insertion of additional padding as needed - this is not required - for your comfort only  You can change the drain dressings if you need to (if they get wet). You will be given extra supplies from the hospital.     Drain Care  Proper drain care is an important part of your home care and will help to prevent fluid collection, minimize the risk for infection, and increase the success of your breast reconstruction.  Empty your drains at 8 am and 8 pm each day  DO NOT GET DRAIN SITES WET  Record each drain separately and use the drain sheet given to you to record the drainage. Follow the instructions on the drain sheet.  Wash your hands before and after emptying your drains  Bring drain sheet with you to your first office visit    Bathing/Showers  You will needs to sponge bathe until your drains are removed. You may shower, but  only from the waist down. Before shower, take out all dressings from bra. Reinforce drain sites with additional waterproof dressings if needed. These will be given to you by the hospital. If some water gets underneath the dressing during the shower, just replace with a new dry waterproof dressing.   DO NOT GET DRAIN SITES WET  No baths, swimming, or hot tubs until you receive medical permission    Pain Medication: Norco  Take one or two tablets every six hours as needed for pain.  Do not take narcotics, if you do not have pain.  Keep stools soft.  Take a stool softener (Metamucil, Milk of Magnesia, Colace).  Eating fruit will also help to prevent constipation    Antibiotics: Keflex 4x/day until your drains come out  Antibiotics will help minimize the risk of a wound infection  Please note the refills on this prescription. If your drains are kept in after you finish the first course of the antibiotic, you need to continue refilling this until your drains are completely removed.   Fill the prescription as directed   Follow the instructions as written on the bottle's label  Call our office if you experience nausea, rash, or other symptoms which might be a possible side effect.    Over-The-Counter Medication  Non-prescription anti-inflammatory medications can also help to ease the pain.  You can take Aleve or ibuprofen starting 72 hours after surgery  Take as directed on the bottle  Drink a full glass of water with the medication    Home Medication  Resume your home medications as instructed  Do not resume herbal medications for two weeks    Diet  Resume your normal diet    Activity  No strenuous activity or heavy lifting (no lifting over 10 pounds)  No activities that involve your pectoralis muscles (no planks, push-ups, etc)  You can go up and down the stairs as tolerated.   You cannot return to work, if your work requires strenuous activity.  You cannot return to physical exercise, sports, or gym workouts until you  are allowed to participate in strenuous activity.    Driving  Do not drive, if you are taking pain medication.    Return to Work or School  You can return to work when you are not taking pain medication, if your work does not involve strenuous activity.  Contact the office, if you need a medical note.     Follow-up Appointment   Our office will call you to set up a time  Call the office for an appointment in two days if you cannot remember the appointment details.    Verify your appointment date, day, time, and location.  At your 1st postoperative office visit:  Your drains will be examined, wounds will be evaluated, healing assessed, and any additional concerns and instructions will be discussed.    Questions or Concerns  Call Dr. Sena's office if you experience sudden swelling of the breast or purple/red/black discoloration of the breast.     Daria   Thank you for coming to [facility] for your operation.  The nurses and the anesthesiologist try very hard to make sure you receive the best care possible.  Your trust in them is greatly appreciated.    Thanks so much,  Dr. Jaida Kaba, ELENIP-C        Caring for a Closed Suction Drainage Tube  A drainage tube removes fluid from around an incision. This helps prevent infection and promotes healing. The collection bulb at the end of the tube is squeezed and plugged to create suction. The bulb should be emptied and reset when half full to maintain adequate suction. You need to empty the bulb and clean the skin around the drain as often as your healthcare provider tells you to. Follow the steps you were taught in the hospital, including how to measure and keep track of how much fluid is coming out of the drain, or how to flush the tube if needed. Follow your healthcare team's specific instructions.         Supplies  Have the following items ready:  Disposable gloves  Measuring cup  Record sheet  Gauze or paper towel  Sterile cotton swabs or 4-inch x 4-inch  gauze pads  Sterile saline or soap and water  When to call your healthcare provider  Call your healthcare provider if you notice any of these changes:  The amount of fluid increases or decreases suddenly  Large amount of blood or a clot in drainage  Color, odor, or thickness of the fluid changes  Tube falls out or the incision opens  The stitch that holds the drain in place falls out, or is no longer attached to the drain.  Skin around the drain is red, swollen, painful, or seeping pus  You have a fever of 100.4°F ( 38°C ) or higher, or as directed by your healthcare provider  Chills  Tips  Here are tips to drain the tube:  Uncurl any kinks in the tube.  With one hand, firmly hold the base of the tube between your thumb and index finger. Don't touch the incision.  Put the thumb and index finger of your other hand on the tube, next to the first hand. Pinch your fingers together. Then pull them along the tube toward the bag. This will help push any clogged fluid through the tube. This is called stripping the tube. You may find it helpful to hold an alcohol swab between your fingers and the tube to lubricate the tubing.  If the tube still does not drain, call your healthcare provider.  [employer] last reviewed this educational content on 3/1/2022  © 3282-4376 [employer]. All rights reserved. This information is not intended as a substitute for professional medical care. Always follow your healthcare professional's instructions.      You have been given a prescription for Norco 5/325  Norco was Given to you at: 7:47 PM  Next dose due: 12:00 AM   Take this medication as directed  This medication contains Tylenol (acetaminophen)  Do not take additional Tylenol while taking Norco    Norco is a Narcotic and can be constipating or upset your stomach  Don't take Norco on an empty stomach  Drink plenty of water  Alcoholic beverages should be avoided while taking narcotics

## 2024-06-05 NOTE — OPERATIVE REPORT
DATE OF SURGERY:06/05/24   PREOPERATIVE DIAGNOSIS: Right breast cancer with acquired absence of bilateral breasts.  POSTOPERATIVE DIAGNOSIS: Right breast cancer with acquired absence of bilateral breasts.  PROCEDURE PERFORMED: Immediate bilateral breast reconstruction with tissue expander and acellular dermal matrix.  ASSISTANT: CHET Sanders  ANESTHESIA: General with endotracheal intubation.  ESTIMATED BLOOD LOSS: 25ml  DRAINS: Moncho Patel x 4  SPECIMENS: None  COMPLICATIONS: None.     FINDINGS: Bilateral breasts reconstructed with Natrelle 133S smooth tissue expander, 500 mL, reference [id number]. On the right serial #[id number].  On the left, serial #[id number]. The tissue expanders were placed in the prepectoral position with anterior coverage of Alloderm. No intra-operative expansion was performed.     INDICATIONS: The patient is a 63-year-old female with a history of right breast cancer. The patient was evaluated by Dr. Espitia and elected to undergo bilateral skin-sparing mastectomy. She was referred preoperatively to discuss reconstructive options and opted for immediate reconstruction with tissue expanders and acellular dermal matrix.    PROCEDURE: Informed consent was obtained from the patient. The risks, benefits, and alternatives were reviewed with the patient preoperatively. She expressed understanding and wished to proceed. The patient was marked in the preoperative holding area in the upright position. The midline, inframammary fold, lateral fold of the breasts were marked. Periareolar incisions were marked in conjunction with Dr. Espitia. The patient was then taken to the operating room by Dr. Espitia's team where she underwent bilateral skin-sparing mastectomy.  Once the mastectomies were completed, I entered the room and the plastic surgery portion of the procedure began.    The surgical site was re-draped sterilely.  The mastectomy skin flaps were examined and appeared of suitable thickness of  viability to facilitate immediate reconstruction.  The pockets were irrigated with warm saline irrigation until all particulate fat was evacuated. Hemostasis was then secured with electrocautery.  Next, the pockets were irrigated with Betadine irrigation and then with antibiotic irrigation until clear.      Next gloves were changed and 4 sheets of large contoured perforated AlloDerm were brought on the field and prepared in saline. Two sheets of AlloDerm were secured along the long axis the running 2-0 PDS suture.  The tissue expanders were then brought on the field and immediately bathed in  antibiotic irrigation.  They were checked for integrity and noted to be intact.  The AlloDerm was draped over the anterior surface of the tissue expander.  Fenestrations were then created to allow passage of the suture tabs which were secured to the  AlloDerm with interrupted 3-0 Vicryl sutures.  A posterior pursestring suture of 2-0 PDS was then placed.  An identical construct was created for both sides.  The expanders were then accessed and all air was evacuated.      Gloves were then changed and the surgical sites were isolated with Ioban.  The tissue expander/AlloDerm constructs were then delivered via the incisions and sutured to the chest wall with interrupted 2-0 Prolene sutures.  The AlloDerm along the inferior gutter was secured along the inframammary fold with interrupted 2-0 Vicryl sutures.  Superiorly the AlloDerm was secured to the pectoralis muscle with interrupted 2-0 Vicryl sutures.  Laterally, the AlloDerm was secured to the serratus fascia with interrupted 2-0 Vicryl sutures.      The  mastectomy margins were sharply inspected and appeared well-perfused. Two 15-Lao Ming drains per side were exited through a lateral stab incision and sutured to the skin with 3-0 nylon suture. The skin was then closed with interrupted 3-0 Vicryl deep dermal suture and running 4-0 Monocryl subcuticular suture.     The drain  sites were dressed with BioPatch and Tegaderm. The incisions dressed with Dermabond, steristrips, Fluff gauze, and a surgical bra. The patient was awakened, extubated, and taken to the recovery area in stable condition. There were no operative complications. All needle, sponge, and instrument counts were correct at the end of the procedure.

## 2024-06-05 NOTE — ANESTHESIA PROCEDURE NOTES
Regional Block    Date/Time: 6/5/2024 12:59 PM    Performed by: Mark Rey MD  Authorized by: Mark Rey MD      General Information and Staff    Start Time:  6/5/2024 12:59 PM  End Time:  6/5/2024 1:07 PM  Anesthesiologist:  Mark Rey MD  Performed by:  Anesthesiologist  Patient Location:  OR    Block Placement: Post Induction  Site Identification: real time ultrasound guided and image stored and retrievable    Block site/laterality marked before start: site marked  Reason for Block: at surgeon's request and post-op pain management    Preanesthetic Checklist: 2 patient identifers, IV checked, site marked, risks and benefits discussed, monitors and equipment checked, pre-op evaluation, timeout performed, anesthesia consent, sterile technique used, no prohibitive neurological deficits and no local skin infection at insertion site      Procedure Details    Patient Position:  Supine  Prep: ChloraPrep    Monitoring:  Cardiac monitor, continuous pulse ox and blood pressure cuff  Anesthesia block type: Serratus Anterior Block.  Injection Technique:  Single-shot    Needle    Needle Type:  Short-bevel and echogenic  Needle Gauge:  21 G  Needle Localization:  Ultrasound guidance  Reason for Ultrasound Use: appropriate spread of the medication was noted in real time and no ultrasound evidence of intravascular and/or intraneural injection            Assessment    Injection Assessment:  Good spread noted, negative resistance, negative aspiration for heme, incremental injection and low pressure  Heart Rate Change: No    - Patient tolerated block procedure well without evidence of immediate block related complications.     Medications  6/5/2024 12:59 PM      Additional Comments    Medication:  Bupivacaine 0.25% 25mL + Decadron PF 2mg (each side)

## 2024-06-05 NOTE — INTERVAL H&P NOTE
Pre-op Diagnosis: RIGHT BREAST CANCER  Details of surgery reviewed  The above referenced H&P was reviewed by Khurram Espitia MD on 6/5/2024, the patient was examined and no significant changes have occurred in the patient's condition since the H&P was performed.  I discussed with the patient and/or legal representative the potential benefits, risks and side effects of this procedure; the likelihood of the patient achieving goals; and potential problems that might occur during recuperation.  I discussed reasonable alternatives to the procedure, including risks, benefits and side effects related to the alternatives and risks related to not receiving this procedure.  We will proceed with procedure as planned.

## 2024-06-05 NOTE — ANESTHESIA PREPROCEDURE EVALUATION
PRE-OP EVALUATION    Patient Name: Daria Piedra    Admit Diagnosis: RIGHT BREAST CANCER    Pre-op Diagnosis: RIGHT BREAST CANCER    RIGHT BREAST MASTECTOMY, SENTINEL LYMPH NODE BIOPSY RIGHT BREAST AND POSSIBLE AXILLARY LYMPH NODE DISSECTION, LEFT BREAST MASTECTOMY ([place]) Immediate breast reconstruction with placement of bilateral breast tissue expanders and acellular dermal matrix ([place])    Anesthesia Procedure: RIGHT BREAST MASTECTOMY, SENTINEL LYMPH NODE BIOPSY RIGHT BREAST AND POSSIBLE AXILLARY LYMPH NODE DISSECTION, LEFT BREAST MASTECTOMY ([place]) Immediate breast reconstruction with placement of bilateral breast tissue expanders and acellular dermal matrix (GILLES) (Bilateral)  .    Surgeons and Role:  Panel 1:     * Khurram Espitia MD - Primary  Panel 2:     * Jeff Sena MD - Primary    Pre-op vitals reviewed.  Temp: 98.1 °F (36.7 °C)  Pulse: 89  Resp: 16  BP: 164/91  SpO2: 100 %  Body mass index is 22.76 kg/m².    Current medications reviewed.  Hospital Medications:   diazePAM (Valium) tab 5 mg  5 mg Oral PRN    [COMPLETED] lidocaine-prilocaine (Emla) 2.5-2.5 % cream   Topical Once    ceFAZolin (Ancef) 2g in 10mL IV syringe premix  2 g Intravenous Once    acetaminophen (Tylenol Extra Strength) tab 1,000 mg  1,000 mg Oral Once    lactated ringers infusion   Intravenous Continuous    ceFAZolin (Ancef) 1 g, gentamicin (Garamycin) 80 mg in sodium chloride 0.9% 1,000 mL irrigation   Irrigation Once (Intra-Op)       Outpatient Medications:     Medications Prior to Admission   Medication Sig Dispense Refill Last Dose    metoprolol succinate ER 50 MG Oral Tablet 24 Hr Take 1 tablet (50 mg total) by mouth daily.   6/5/2024 at 0800       Allergies: Patient has no known allergies.      Anesthesia Evaluation    Patient summary reviewed.    Anesthetic Complications  (-) history of anesthetic complications         GI/Hepatic/Renal                                 Cardiovascular      ECG reviewed.  Exercise  tolerance: good     MET: >4                        (+) dysrhythmias and SVT                  Endo/Other               (+) anemia                   Pulmonary                           Neuro/Psych                              Patient Active Problem List:     Other B-complex deficiencies     Unspecified vitamin D deficiency     Sclerosing adenosis of breast     History of adenomatous polyp of colon     BRCA gene mutation negative in female     Anemia, unspecified     Low grade myelodysplastic syndrome lesions (HCC)     Abnormal echocardiogram     NSVT (nonsustained ventricular tachycardia) (HCC)     Sinus tachycardia     History of duodenal ulcer            Past Surgical History:   Procedure Laterality Date    Benign biopsy left      stereotactic          x4    Cholecystectomy  2018    Colonoscopy  2010    complete, repeat 5 years    Colonoscopy      Cyst aspiration left  8 years ago?    Egd      Other surgical history  2012    MRI guided core breast biopsy left breast 3:00 and at the central region by Dr. Brooks at [facility]    Other surgical history      insertion of power port    Upper gi endoscopy,exam  2011    EGD     Social History     Socioeconomic History    Marital status:    Tobacco Use    Smoking status: Never    Smokeless tobacco: Never   Vaping Use    Vaping status: Never Used   Substance and Sexual Activity    Alcohol use: Not Currently     Comment: Occasional glass of wine or beer if dinner out    Drug use: Never   Other Topics Concern    Caffeine Concern Yes     Comment: 1 q day    Stress Concern No    Weight Concern No    Special Diet No    Exercise Yes     Comment: 30 minutes on threadmill daily    Seat Belt Yes     History   Drug Use Unknown     Available pre-op labs reviewed.  Lab Results   Component Value Date    WBC 3.7 (L) 2024    RBC 2.49 (L) 2024    HGB 8.5 (L) 2024    HCT 25.1 (L) 2024    .8 (H) 2024    MCH 34.1  (H) 03/27/2024    MCHC 33.9 03/27/2024    RDW 15.7 (H) 03/27/2024    .0 03/27/2024     Lab Results   Component Value Date     03/27/2024    K 4.4 03/27/2024    K 4.4 03/27/2024     03/27/2024    CO2 24.0 03/27/2024    BUN 9 03/27/2024    CREATSERUM 0.69 03/27/2024    GLU 98 03/27/2024    CA 10.0 03/27/2024            Airway      Mallampati: II  Mouth opening: >3 FB  TM distance: 4 - 6 cm  Neck ROM: full Cardiovascular      Rhythm: regular  Rate: normal     Dental    Dentition appears grossly intact         Pulmonary      Breath sounds clear to auscultation bilaterally.               Other findings              ASA: 2   Plan: general  NPO status verified and patient meets guidelines.    Post-procedure pain management plan discussed with surgeon and patient.  Surgeon requests: regional block    Plan/risks discussed with: patient and spouse                Present on Admission:  **None**

## 2024-06-05 NOTE — OPERATIVE REPORT
[facility]    PATIENT'S NAME: MARYBEL ALMAZAN   ATTENDING PHYSICIAN: Khurram Espitia M.D.   OPERATING PHYSICIAN: Khurram Espitia M.D.   PATIENT ACCOUNT#:   [id number]    [facility]  PACU [facility] 10  MEDICAL RECORD #:   [id number]       YOB: 1960  ADMISSION DATE:       06/05/2024      OPERATION DATE:  06/05/2024    OPERATIVE REPORT      PREOPERATIVE DIAGNOSIS:  Right breast cancer.  POSTOPERATIVE DIAGNOSIS:  Right breast cancer.  PROCEDURE:  Left mastectomy, right lymphatic mapping, right sentinel node biopsy, right mastectomy.      ASSISTANT:  SERA Watson     ANESTHESIA:  General.    OPERATIVE TECHNIQUE:  Patient was brought in the operating room, placed on the operating table in supine position.  Inhalation anesthesia was provided by the attending anesthesiologist.  Both breasts and axillae were prepped in the usual sterile fashion.  The patient had a history of triple-negative breast cancer on the right side.  She completed neoadjuvant chemotherapy with excellent response.  She elected for a bilateral mastectomy as her surgical treatment.  I began the surgery on the left side.  I made an elliptical skin incision around the nipple complex.  I created skin flaps superiorly, inferiorly, medially, and laterally to the limits of the breast mound.  I removed the breast off the chest wall using electrocautery.  I placed a stitch at the axillary tail for orientation.  Specimen was sent to Pathology for histological examination.  Hemostasis was obtained with both the electrocautery and LigaSure as needed.  Skin flaps appeared viable.  The wound was packed open.  I went over on the right side.  On the right side, I began with the lymphatic mapping and the sentinel node biopsy.  I made a skin incision in the axilla, dissected through the subcutaneous tissue to the clavipectoral fascia.  There was a total of 2 sentinel nodes identified.  These were removed and sent for frozen section.   There were no palpable nodes seen.  While waiting for the frozen section, I proceeded with the right mastectomy.  I made a similar elliptical skin incision around the nipple complex.  I created skin flaps superiorly, inferiorly, medially, and laterally to the limits of the breast mound.  I removed the breast off the chest wall using electrocautery.  I placed a silk suture at the axillary tail.  The specimen was sent to Pathology for histological examination.  Hemostasis was obtained with both the LigaSure and electrocautery.  The wound was irrigated with Irrisept.  The skin flaps were inspected and viable, and the sentinel nodes were negative for metastatic disease.  At this time, my portion of the procedure was complete.  Dr. Sena joined the operating team.  He will perform the immediate reconstruction and dictate his portion of procedure separately.    Dictated By Khurram Espitia M.D.  d: 06/05/2024 16:12:14  t: 06/05/2024 16:30:10  [facility] [id number]  [facility]/

## 2024-06-06 ENCOUNTER — TELEPHONE (OUTPATIENT)
Dept: SURGERY | Facility: CLINIC | Age: 64
End: 2024-06-06

## 2024-06-06 NOTE — TELEPHONE ENCOUNTER
Spoke to patient regarding questions on drain parameters and care. All patient questions answered and patient reports understanding.

## 2024-06-14 ENCOUNTER — OFFICE VISIT (OUTPATIENT)
Dept: SURGERY | Facility: CLINIC | Age: 64
End: 2024-06-14
Payer: COMMERCIAL

## 2024-06-14 DIAGNOSIS — Z90.13 ABSENCE OF BREAST, BILATERAL: Primary | ICD-10-CM

## 2024-06-14 PROCEDURE — 1111F DSCHRG MED/CURRENT MED MERGE: CPT

## 2024-06-14 PROCEDURE — 99024 POSTOP FOLLOW-UP VISIT: CPT

## 2024-06-14 NOTE — PROGRESS NOTES
Daria Piedra is a 63 year old female who presents today for a follow-up after left mastectomy, right lymphatic mapping, right sentinel node biopsy, right mastectomy (Dr. Espitia) and immediate bilateral breast reconstruction with tissue expander (Natrelle 133S smooth tissue expander, 500 mL with no intra-operative air) and acellular dermal matrix (Dr. Sena) on 6/5/2024.    She denies fever and chills. She denies nausea, vomiting, diarrhea or constipation.   Her pain is controlled.      Physical Exam     Breasts: Bilateral breast incisions are clean, dry, intact.  There is no evidence of hematoma or seroma bilaterally.  Right axillary incision is clean, dry, intact.  No evidence of hematoma or seroma.  Bilateral mastectomy skin is without erythema.  Bilateral breast drain sites are clean, dry, intact.  Serosanguineous fluid is present.    There were no vitals filed for this visit.      Assessment and Plan     Daria Piedra is doing well s/p left mastectomy, right lymphatic mapping, right sentinel node biopsy, right mastectomy (Dr. Espitia) and immediate bilateral breast reconstruction with tissue expander (Natrelle 133S smooth tissue expander, 500 mL with no intra-operative air) and acellular dermal matrix (Dr. Sena) on 6/5/2024.    Patient is here today for wound check and drain removal.  1 drain from each breast was removed today due to low volume output.  The patient tolerated this well.  A dressing of Neosporin, 2 x 2, Tegaderm was placed.  1 drain was left in each breast today due to high volume output.  These drains were redressed with a new Biopatch and Tegaderm.  Drain care and parameters were reviewed with the patient and her .  They were encouraged to contact the office when these drains are ready to be removed.    New Steri-Strips were applied to the bilateral breast incisions.  Compression activity guidelines were reviewed with the patient and her .    She is following up with   Jaida on 7-16.  She has a second stage D IEP flap hold date of 4-.  She would like to be placed on the wait list.  She was encouraged to contact the office with any questions or concerns.    Questions were answered. Patient understands.     Michelle Kaba, PRAFUL  6/14/2024  11:27 AM

## 2024-06-18 ENCOUNTER — OFFICE VISIT (OUTPATIENT)
Dept: SURGERY | Facility: CLINIC | Age: 64
End: 2024-06-18

## 2024-06-18 DIAGNOSIS — Z90.13 ABSENCE OF BREAST, BILATERAL: Primary | ICD-10-CM

## 2024-06-18 PROCEDURE — 99024 POSTOP FOLLOW-UP VISIT: CPT

## 2024-06-18 PROCEDURE — 1111F DSCHRG MED/CURRENT MED MERGE: CPT

## 2024-06-18 NOTE — PROGRESS NOTES
Daria Piedra is a 63 year old female who presents today for a follow-up after  left mastectomy, right lymphatic mapping, right sentinel node biopsy, right mastectomy (Dr. Espitia) and immediate bilateral breast reconstruction with tissue expander (Natrelle 133S smooth tissue expander, 500 mL with no intra-operative air) and acellular dermal matrix (Dr. Sena) on 6/5/2024.     She denies fever and chills. She denies nausea, vomiting, diarrhea or constipation.   Her pain is controlled.        Physical Exam     Breasts: Bilateral breast incisions are clean, dry, intact. There is no evidence of hematoma or seroma bilaterally. Right axillary incision is clean, dry, intact. No evidence of hematoma or seroma. Bilateral mastectomy skin is without erythema. Bilateral breast drain sites are clean, dry, intact. Serous fluid is present.     There were no vitals filed for this visit.      Assessment and Plan     Daria Piedra is doing well s/p left mastectomy, right lymphatic mapping, right sentinel node biopsy, right mastectomy (Dr. Espitia) and immediate bilateral breast reconstruction with tissue expander (Natrelle 133S smooth tissue expander, 500 mL with no intra-operative air) and acellular dermal matrix (Dr. Sena) on 6/5/2024.     Patient is here today for drain removal.  The remaining bilateral breast drains were removed today due to low volume output.  The patient tolerated this well.  A dressing of Neosporin, 2 x 2, Tegaderm was placed.  The patient may now discontinue her oral antibiotics and shower.    She will follow-up in 1 week to start expansion.  She is then following up with Dr. Sena on 7-16.  She was encouraged to contact the office with any questions or concerns.    She has a [employer] us D IEP flap hold date of 4-.  She is on the wait list.    Questions were answered. Patient understands.     PRAFUL Sanders  6/18/2024  11:59 AM

## 2024-06-25 ENCOUNTER — OFFICE VISIT (OUTPATIENT)
Dept: SURGERY | Facility: CLINIC | Age: 64
End: 2024-06-25

## 2024-06-25 DIAGNOSIS — Z90.13 ABSENCE OF BREAST, BILATERAL: Primary | ICD-10-CM

## 2024-06-25 PROCEDURE — 99024 POSTOP FOLLOW-UP VISIT: CPT

## 2024-06-25 PROCEDURE — 1111F DSCHRG MED/CURRENT MED MERGE: CPT

## 2024-06-25 NOTE — PROGRESS NOTES
Daria Piedra is a 63 year old female who presents today for a follow-up after  left mastectomy, right lymphatic mapping, right sentinel node biopsy, right mastectomy (Dr. Espitia) and immediate bilateral breast reconstruction with tissue expander (Natrelle 133S smooth tissue expander, 500 mL with no intra-operative air) and acellular dermal matrix (Dr. Sena) on 6/5/2024.     She denies fever and chills. She denies nausea, vomiting, diarrhea or constipation.   Her pain is controlled.      Physical Exam     Breasts: Breast incisions are clean, dry, intact.  There is no evidence of hematoma or seroma bilaterally.  Bilateral mastectomy skin is without erythema.    There were no vitals filed for this visit.      Assessment and Plan     Daria Piedra is doing well s/p  left mastectomy, right lymphatic mapping, right sentinel node biopsy, right mastectomy (Dr. Espitia) and immediate bilateral breast reconstruction with tissue expander (Natrelle 133S smooth tissue expander, 500 mL with no intra-operative air) and acellular dermal matrix (Dr. Sena) on 6/5/2024.     As her incisions remain well-healed we will start the expansion process today.  The bilateral breast tissue expander ports were identified using the magnet, these areas were cleansed with Betadine solution.  A sterile butterfly needle was introduced into the bilateral tissue expander ports and 120 mL of saline was instilled.  30 mL of seroma was aspirated bilaterally.  The patient tolerated this well.  A dressing of Neosporin and Band-Aids were placed.  She is now at a bilateral total of 120 mL of saline.    She is following up with me in 1 week for continued expansion.  She is following up with Dr. Sena on 7-16.  She has an autologous flap based reconstruction date of 4-, she is on our wait list.  She was encouraged to contact the office with any questions or concerns.    Questions were answered. Patient understands.     Michelle Kaba  APRN  6/25/2024  10:21 AM

## 2024-07-02 ENCOUNTER — OFFICE VISIT (OUTPATIENT)
Dept: SURGERY | Facility: CLINIC | Age: 64
End: 2024-07-02
Payer: COMMERCIAL

## 2024-07-02 DIAGNOSIS — Z90.13 ABSENCE OF BREAST, BILATERAL: Primary | ICD-10-CM

## 2024-07-02 PROCEDURE — 1111F DSCHRG MED/CURRENT MED MERGE: CPT

## 2024-07-02 PROCEDURE — 99024 POSTOP FOLLOW-UP VISIT: CPT

## 2024-07-02 NOTE — PROGRESS NOTES
Daria Piedra is a 63 year old female who presents today for a follow-up after  left mastectomy, right lymphatic mapping, right sentinel node biopsy, right mastectomy (Dr. Espitia) and immediate bilateral breast reconstruction with tissue expander (Natrelle 133S smooth tissue expander, 500 mL with no intra-operative air) and acellular dermal matrix (Dr. Sena) on 6/5/2024.   She is now at a bilateral total of 120 mL of saline.     She denies fever and chills. She denies nausea, vomiting, diarrhea or constipation.   Her pain is controlled.        Physical Exam     Breasts: Breast incisions are clean, dry, intact. There is no evidence of hematoma or seroma bilaterally. Bilateral mastectomy skin is without erythema.     There were no vitals filed for this visit.      Assessment and Plan     Daria Piedra is doing well s/p  left mastectomy, right lymphatic mapping, right sentinel node biopsy, right mastectomy (Dr. Espitia) and immediate bilateral breast reconstruction with tissue expander (Natrelle 133S smooth tissue expander, 500 mL with no intra-operative air) and acellular dermal matrix (Dr. Sena) on 6/5/2024.     Her incisions remain well-healed we will continue with the expansion process today.The bilateral breast tissue expander ports were identified using the magnet, these areas were cleansed with Betadine solution. A sterile butterfly needle was introduced into the bilateral tissue expander ports and 120 mL of saline was instilled. 0 mL of seroma was aspirated bilaterally. The patient tolerated this well. A dressing of Neosporin and Band-Aids were placed.   She is now at a bilateral total  of 240 mL of saline.    She is following up with Dr. Sena on 7-16.  She has  autologous flap based reconstruction date of 4-, she is on our wait list.  She was encouraged to contact the office with any questions or concerns.     Questions were answered. Patient understands.     PRAFUL Sanders  7/2/2024  10:20  AM

## 2024-07-16 ENCOUNTER — OFFICE VISIT (OUTPATIENT)
Dept: SURGERY | Facility: CLINIC | Age: 64
End: 2024-07-16
Payer: COMMERCIAL

## 2024-07-16 DIAGNOSIS — Z90.13 ABSENCE OF BREAST, BILATERAL: Primary | ICD-10-CM

## 2024-07-16 PROCEDURE — 99024 POSTOP FOLLOW-UP VISIT: CPT | Performed by: SURGERY

## 2024-07-16 NOTE — PROGRESS NOTES
Daria Piedra is a 64 year old female who presents today in follow-up.  She currently has a 13 cm base diameter tissue expander filled to 240 cc.She denies fever and chills. She denies nausea, vomiting, diarrhea or constipation.       Physical Examination:  Breasts:Bilateral breast incisions are clean dry and intact.  There is no erythema or seroma noted.  Acceptable shape and symmetry is noted.  Procedure: Bilateral breast cystoscopy expanded with 120 cc of saline to a total of 360 cc.  No seroma fluid was encountered.    Assessment and Plan:  Patient is doing well.  She will follow-up in 1 week for further expansion.

## 2024-07-23 ENCOUNTER — OFFICE VISIT (OUTPATIENT)
Dept: SURGERY | Facility: CLINIC | Age: 64
End: 2024-07-23
Payer: COMMERCIAL

## 2024-07-23 DIAGNOSIS — Z90.13 ABSENCE OF BREAST, BILATERAL: Primary | ICD-10-CM

## 2024-07-23 PROCEDURE — 99024 POSTOP FOLLOW-UP VISIT: CPT

## 2024-07-23 NOTE — PROGRESS NOTES
Daria Piedra is a 64 year old female who presents today for a follow-up after  left mastectomy, right lymphatic mapping, right sentinel node biopsy, right mastectomy (Dr. Espitia) and immediate bilateral breast reconstruction with tissue expander (Natrelle 133S smooth tissue expander, 500 mL with no intra-operative air) and acellular dermal matrix (Dr. Sena) on 6/5/2024.   She is now at a bilateral total of 360 mL of saline.     She denies fever and chills. She denies nausea, vomiting, diarrhea or constipation.   Her pain is controlled.        Physical Exam     Breasts: Breast incisions are clean, dry, intact. There is no evidence of hematoma or seroma bilaterally. Bilateral mastectomy skin is without erythema.     There were no vitals filed for this visit.      Assessment and Plan     Daria Piedra is doing well s/p left mastectomy, right lymphatic mapping, right sentinel node biopsy, right mastectomy (Dr. Espitia) and immediate bilateral breast reconstruction with tissue expander (Natrelle 133S smooth tissue expander, 500 mL with no intra-operative air) and acellular dermal matrix (Dr. Sena) on 6/5/2024.     As her incisions remain well-healed we will continue with the expansion process today.The bilateral breast tissue expander ports were identified using the magnet, these areas were cleansed with Betadine solution. A sterile butterfly needle was introduced into the bilateral tissue expander ports and 120 mL of saline was instilled. 0 mL of seroma was aspirated bilaterally. The patient tolerated this well. A dressing of Neosporin and Band-Aids were placed.   She is now at a bilateral total  of 480 mL of saline.    This patient is now fully expanded.  She has an autologous flap based reconstruction date of 4-.  She is on our wait list.  She has a preop appointment with Dr. Sena on 11-5.  She was encouraged to contact the office with any questions or concerns.    Questions were answered. Patient  understands.     Michelle Kaba, APRN  7/23/2024  10:38 AM

## 2024-09-19 RX ORDER — ANASTROZOLE 1 MG/1
1 TABLET ORAL DAILY
COMMUNITY

## 2024-09-27 ENCOUNTER — HOSPITAL ENCOUNTER (OUTPATIENT)
Facility: HOSPITAL | Age: 64
Setting detail: HOSPITAL OUTPATIENT SURGERY
Discharge: HOME | End: 2024-09-27
Attending: SURGERY | Admitting: SURGERY
Payer: COMMERCIAL

## 2024-09-27 ENCOUNTER — ANESTHESIA (OUTPATIENT)
Dept: SURGERY | Facility: HOSPITAL | Age: 64
End: 2024-09-27
Payer: COMMERCIAL

## 2024-09-27 ENCOUNTER — ANESTHESIA EVENT (OUTPATIENT)
Dept: SURGERY | Facility: HOSPITAL | Age: 64
End: 2024-09-27
Payer: COMMERCIAL

## 2024-09-27 VITALS
TEMPERATURE: 97 F | OXYGEN SATURATION: 100 % | BODY MASS INDEX: 23.52 KG/M2 | SYSTOLIC BLOOD PRESSURE: 105 MMHG | RESPIRATION RATE: 16 BRPM | DIASTOLIC BLOOD PRESSURE: 65 MMHG | WEIGHT: 146.31 LBS | HEART RATE: 73 BPM | HEIGHT: 66 IN

## 2024-09-27 PROCEDURE — 0JPT0XZ REMOVAL OF TUNNELED VASCULAR ACCESS DEVICE FROM TRUNK SUBCUTANEOUS TISSUE AND FASCIA, OPEN APPROACH: ICD-10-PCS | Performed by: SURGERY

## 2024-09-27 RX ORDER — LIDOCAINE HYDROCHLORIDE 10 MG/ML
INJECTION, SOLUTION EPIDURAL; INFILTRATION; INTRACAUDAL; PERINEURAL AS NEEDED
Status: DISCONTINUED | OUTPATIENT
Start: 2024-09-27 | End: 2024-09-27 | Stop reason: SURG

## 2024-09-27 RX ORDER — ACETAMINOPHEN 500 MG
1000 TABLET ORAL ONCE
Status: DISCONTINUED | OUTPATIENT
Start: 2024-09-27 | End: 2024-09-27

## 2024-09-27 RX ORDER — METOPROLOL SUCCINATE 25 MG/1
37.5 TABLET, EXTENDED RELEASE ORAL DAILY
COMMUNITY
Start: 2024-09-12

## 2024-09-27 RX ORDER — SPIRONOLACTONE 25 MG/1
12.5 TABLET ORAL DAILY
COMMUNITY
Start: 2024-09-26

## 2024-09-27 RX ORDER — ONDANSETRON 2 MG/ML
4 INJECTION INTRAMUSCULAR; INTRAVENOUS EVERY 6 HOURS PRN
Status: DISCONTINUED | OUTPATIENT
Start: 2024-09-27 | End: 2024-09-27

## 2024-09-27 RX ORDER — NALOXONE HYDROCHLORIDE 0.4 MG/ML
0.08 INJECTION, SOLUTION INTRAMUSCULAR; INTRAVENOUS; SUBCUTANEOUS AS NEEDED
Status: DISCONTINUED | OUTPATIENT
Start: 2024-09-27 | End: 2024-09-27

## 2024-09-27 RX ORDER — SODIUM CHLORIDE, SODIUM LACTATE, POTASSIUM CHLORIDE, CALCIUM CHLORIDE 600; 310; 30; 20 MG/100ML; MG/100ML; MG/100ML; MG/100ML
INJECTION, SOLUTION INTRAVENOUS CONTINUOUS
Status: DISCONTINUED | OUTPATIENT
Start: 2024-09-27 | End: 2024-09-27

## 2024-09-27 RX ORDER — BUPIVACAINE HYDROCHLORIDE 5 MG/ML
INJECTION, SOLUTION EPIDURAL; INTRACAUDAL AS NEEDED
Status: DISCONTINUED | OUTPATIENT
Start: 2024-09-27 | End: 2024-09-27

## 2024-09-27 RX ORDER — ACETAMINOPHEN 500 MG
1000 TABLET ORAL ONCE AS NEEDED
Status: DISCONTINUED | OUTPATIENT
Start: 2024-09-27 | End: 2024-09-27

## 2024-09-27 RX ORDER — LIDOCAINE HYDROCHLORIDE AND EPINEPHRINE 10; 10 MG/ML; UG/ML
INJECTION, SOLUTION INFILTRATION; PERINEURAL AS NEEDED
Status: DISCONTINUED | OUTPATIENT
Start: 2024-09-27 | End: 2024-09-27

## 2024-09-27 RX ADMIN — SODIUM CHLORIDE, SODIUM LACTATE, POTASSIUM CHLORIDE, CALCIUM CHLORIDE: 600; 310; 30; 20 INJECTION, SOLUTION INTRAVENOUS at 09:36:00

## 2024-09-27 RX ADMIN — SODIUM CHLORIDE, SODIUM LACTATE, POTASSIUM CHLORIDE, CALCIUM CHLORIDE: 600; 310; 30; 20 INJECTION, SOLUTION INTRAVENOUS at 09:51:00

## 2024-09-27 RX ADMIN — LIDOCAINE HYDROCHLORIDE 50 MG: 10 INJECTION, SOLUTION EPIDURAL; INFILTRATION; INTRACAUDAL; PERINEURAL at 09:39:00

## 2024-09-27 NOTE — ANESTHESIA POSTPROCEDURE EVALUATION
[facility]    Daria Piedra Patient Status:  Hospital Outpatient Surgery   Age/Gender 64 year old female MRN [id number]   Location [facility] SURGERY Attending Khurram Espitia MD   Hosp Day # 0 PCP Maegan Petit MD       Anesthesia Post-op Note    REMOVAL OF PORT-A-CATHETER    Procedure Summary       Date: 09/27/24 Room / Location:  MAIN OR 09 / [facility] MAIN OR    Anesthesia Start: 0933 Anesthesia Stop: 0959    Procedure: REMOVAL OF PORT-A-CATHETER (Chest) Diagnosis: (MALIGNANT NEOPLASM OF CENTRAL PORTION OF RIGHT BREAST IN FEMALE, ESTROGEN RECEPTOR POSITIVE)    Surgeons: Khurram Espitia MD Responsible Provider: Karri Calderon MD    Anesthesia Type: MAC ASA Status: 2            Anesthesia Type: MAC    Vitals Value Taken Time   /76 09/27/24 1003   Temp 97.8 09/27/24 1003   Pulse 74 09/27/24 1003   Resp 18 09/27/24 1003   SpO2 98% 09/27/24 1003       Patient Location: Same Day Surgery    Anesthesia Type: MAC    Airway Patency: patent    Postop Pain Control: adequate    Mental Status: mildly sedated but able to meaningfully participate in the post-anesthesia evaluation    Nausea/Vomiting: none    Cardiopulmonary/Hydration status: stable euvolemic    Complications: no apparent anesthesia related complications    Postop vital signs: stable    Dental Exam: Unchanged from Preop    Patient to be discharged home when criteria met.

## 2024-09-27 NOTE — OPERATIVE REPORT
OPERATIVE REPORT   PREOPERATIVE DIAGNOSIS:  h/o breast cancer  POSTOPERATIVE DIAGNOSIS: Same   PROCEDURE PERFORMED: Removal of Port-A-Cath.   ASST TIFF Adamson she assisted by prepping draping retracting suturing  DESCRIPTION OF PROCEDURE: The patient was brought into the operating room and placed on the operating table in the supine position. The sedation was administered by the attending anesthesiologist. The chest was prepped and draped in the usual sterile fashion. One percent Lidocaine was used as a local anesthetic.  I made a skin incision within the old scar of the Port-A-Cath site. The incision was carried down to the subcutaneous tissue. Using the electrocautery, the cavity was opened. The port was removed including its catheter tip. The venous tunnel was suture-ligated with a 3-0 Vicryl suture. The wound was irrigated with sterile saline. The wound was closed in layers using absorbable sutures deeply and a running Vicryl for the skin. Steri-Strips and a sterile dressing were provided.   The patient went to Same Day Surgery for observation.   Dictated By Khurram Espitia M.D.

## 2024-09-27 NOTE — DISCHARGE INSTRUCTIONS
Home Care Instructions Following Removal of Your Port     Daria-  We hope you were pleased with your care at [facility].  We wish you the best outcome and overall experience with your operation.  These instructions will help to minimize pain, optimize healing, and improve the likelihood of a successful result.    What To Expect  There will be some spotting of the incision lines for the next 1-2 weeks.  Seepage and staining of the Steri-Strips(white tape) is typical.  Tenderness, bruising, and swelling of the surgical site is common and will resolve in 2-3 weeks.    Bandages (Dressing)  Keep dressing clean for 5 days  Do not remove your bandage for 5 days  Do not get your incision wet for 5 days  Gently remove your bandage in 5 days.  Leave the Steri-Strips(white tape) intact.    Do not remove Steri-Strips.  They will loosen and fall off within 2-3 weeks.    Bathing/Showers  You can resume showers tomorrow  Refrain from baths, swimming, or hot tubs for two weeks    Over-The-Counter Medication  Non-prescription anti-inflammatory medications can also help to ease the pain.  You can take Aleve or ibuprofen   Take as directed on the bottle  Drink a full glass of water with the medication. Do not take medication on an empty stomach.    Home Medication  Resume your home medications as instructed    Diet  Resume your normal diet    Activity  No strenuous activity for 4 weeks.  You can go up and down the stairs as tolerated.  Use common sense.  You can return to work, if your work is sedentary in nature. Do not return to work for 4 weeks, if your work requires strenuous activity.  Call Dr. Espitia's office if you need a note for work.  You cannot return to physical exercise until you are allowed to participate in strenuous activity.    Driving  Do not drive, if you are taking pain medication.      Questions or Concerns  Call Dr. Espitia's office if you experience severe pain not controlled by pain medication, redness,  drainage, swelling, numbness, tingling, bleeding, fever, or other concerns.  If your call is made after office hours, a physician's assistant or nurse practitioner will be available to help you.  There is always a surgeon covering Dr. Espitia's patients, if he is unavailable.    Daria  Thank you for coming to [facility] for your operation.  The nurses and the anesthesiologist try very hard to make sure you receive the best care possible.  Your trust in them is greatly appreciated.    Thanks so much,  Dr. Espitia

## 2024-09-27 NOTE — H&P
History & Physical Examination    Patient Name: Daria Piedra  MRN: [id number]  [facility]: [id number]  YOB: 1960    Diagnosis: h/o breast cancer  Here for port removal        Medications Prior to Admission   Medication Sig Dispense Refill Last Dose    metoprolol succinate ER 25 MG Oral Tablet 24 Hr Take 1.5 tablets (37.5 mg total) by mouth daily.   9/27/2024 at 0600    anastrozole 1 MG Oral Tab tab Take 1 tablet (1 mg total) by mouth daily.   9/26/2024 at 1900    sacubitril-valsartan 24-26 MG Oral Tab Take 1 tablet by mouth 2 (two) times daily.   9/26/2024 at 1900    empagliflozin (JARDIANCE) 10 MG Oral Tab Take 1 tablet (10 mg total) by mouth daily.   9/22/2024    Calcium Carb-Cholecalciferol (CALCIUM+D3 OR) Take 1,000 mg by mouth.   9/26/2024 at 1900    metoprolol succinate ER 50 MG Oral Tablet 24 Hr Take 1 tablet (50 mg total) by mouth daily.       apixaban 5 MG Oral Tab Take 1 tablet (5 mg total) by mouth 2 (two) times daily.   Unknown    spironolactone 25 MG Oral Tab Take 0.5 tablets (12.5 mg total) by mouth daily.   Unknown     Current Facility-Administered Medications   Medication Dose Route Frequency    [Transfer Hold] acetaminophen (Tylenol Extra Strength) tab 1,000 mg  1,000 mg Oral Once    lactated ringers infusion   Intravenous Continuous    ceFAZolin (Ancef) 2g in 10mL IV syringe premix  2 g Intravenous Once       Allergies: No Known Allergies    Past Medical History:    Arrhythmia    s/p chemotherapy-doxorubicin; started on medication metoprolol    Body piercing    Ears    BRCA gene mutation negative in female    Negative 84 gene hereditary cancer panel, report in media tab    Breast cyst    Cancer (HCC)    Right breast    Congestive heart disease (HCC)    History of blood transfusion    2/2024- NO REACTION    Personal history of antineoplastic chemotherapy    RECEIVED ANTIBODY INFUSION 07/2024    Screening for other and unspecified genitourinary condition    Visual impairment    Contacts/Glasses     Wears glasses     Past Surgical History:   Procedure Laterality Date    Benign biopsy left      stereotactic          x4    Cholecystectomy  2018    Colonoscopy  2010    complete, repeat 5 years    Colonoscopy      Cyst aspiration left  8 years ago?    Egd      Mastectomy left  2024    Mastectomy right      Other surgical history  2012    MRI guided core breast biopsy left breast 3:00 and at the central region by Dr. [facility] at [facility]    Other surgical history      insertion of power port    Upper gi endoscopy,exam  2011    EGD     Family History   Problem Relation Age of Onset    Heart Disorder Father     Other (Pulmonary fibrosis) Father     Other (Leukemia) Mother     Colon Cancer Son         \"precancerous anal\"    Cancer Son         skin    Diabetes Brother     Colon Polyps Brother     Colon Cancer Brother         pre-cancer cells removed    Heart Disorder Brother     Other (Benign tubular adenoma of the large intestine) Brother     Diabetes Brother     Diabetes Other     Colon Cancer Son         pre cancer anal removed     Social History     Tobacco Use    Smoking status: Never    Smokeless tobacco: Never   Substance Use Topics    Alcohol use: Not Currently     Comment: Occasional glass of wine or beer if dinner out       SYSTEM Check if Review is Normal Check if Physical Exam is Normal If not normal, please explain:   HEENT [x ] x    NECK & BACK x x    HEART x x    LUNGS x x    ABDOMEN x x    UROGENITAL [ ] [ ]    EXTREMITIES x x    OTHER   Port left chest   Here for port removal  [ x ] I have discussed the risks and benefits and alternatives with the patient/family.  They understand and agree to proceed with plan of care.  [ x ] I have reviewed the History and Physical done within the last 30 days.  Any changes noted above.    Khurram Espitia MD  2024  9:18 AM

## 2024-09-27 NOTE — ANESTHESIA PREPROCEDURE EVALUATION
PRE-OP EVALUATION    Patient Name: Daria Piedra    Admit Diagnosis: RIGHT BREAST CANCER    Pre-op Diagnosis: RIGHT BREAST CANCER    REMOVAL OF PORT-A-CATHETER    Anesthesia Procedure: REMOVAL OF PORT-A-CATHETER    Surgeon(s) and Role:     * Khurram Espitia MD - Primary    Pre-op vitals reviewed.  Temp: 97.7 °F (36.5 °C)  Pulse: 84  Resp: 16  BP: 109/78  SpO2: 99 %  Body mass index is 23.61 kg/m².    Current medications reviewed.  Hospital Medications:   acetaminophen (Tylenol Extra Strength) tab 1,000 mg  1,000 mg Oral Once    lactated ringers infusion   Intravenous Continuous    ceFAZolin (Ancef) 2g in 10mL IV syringe premix  2 g Intravenous Once       Outpatient Medications:     Medications Prior to Admission   Medication Sig Dispense Refill Last Dose    metoprolol succinate ER 25 MG Oral Tablet 24 Hr Take 1.5 tablets (37.5 mg total) by mouth daily.   9/27/2024 at 0600    anastrozole 1 MG Oral Tab tab Take 1 tablet (1 mg total) by mouth daily.   9/26/2024 at 1900    sacubitril-valsartan 24-26 MG Oral Tab Take 1 tablet by mouth 2 (two) times daily.   9/26/2024 at 1900    empagliflozin (JARDIANCE) 10 MG Oral Tab Take 1 tablet (10 mg total) by mouth daily.   9/22/2024    Calcium Carb-Cholecalciferol (CALCIUM+D3 OR) Take 1,000 mg by mouth.   9/26/2024 at 1900    metoprolol succinate ER 50 MG Oral Tablet 24 Hr Take 1 tablet (50 mg total) by mouth daily.       apixaban 5 MG Oral Tab Take 1 tablet (5 mg total) by mouth 2 (two) times daily.   Unknown    spironolactone 25 MG Oral Tab Take 0.5 tablets (12.5 mg total) by mouth daily.   Unknown       Allergies: Patient has no known allergies.      Anesthesia Evaluation    Patient summary reviewed.    Anesthetic Complications           GI/Hepatic/Renal                                 Cardiovascular                                                       Endo/Other                                  Pulmonary                           Neuro/Psych                                       Past Surgical History:   Procedure Laterality Date    Benign biopsy left      stereotactic          x4    Cholecystectomy  2018    Colonoscopy  2010    complete, repeat 5 years    Colonoscopy      Cyst aspiration left  8 years ago?    Egd      Mastectomy left  2024    Mastectomy right      Other surgical history  2012    MRI guided core breast biopsy left breast 3:00 and at the central region by Dr. [facility] at [facility]    Other surgical history      insertion of power port    Upper gi endoscopy,exam  2011    EGD     Social History     Socioeconomic History    Marital status:    Tobacco Use    Smoking status: Never    Smokeless tobacco: Never   Vaping Use    Vaping status: Never Used   Substance and Sexual Activity    Alcohol use: Not Currently     Comment: Occasional glass of wine or beer if dinner out    Drug use: Never   Other Topics Concern    Caffeine Concern Yes     Comment: 1 q day    Stress Concern No    Weight Concern No    Special Diet No    Exercise Yes     Comment: 30 minutes on threadmill daily    Seat Belt Yes     History   Drug Use Unknown     Available pre-op labs reviewed.               Airway      Mallampati: I  Mouth opening: >3 FB  TM distance: > 6 cm  Neck ROM: full Cardiovascular    Cardiovascular exam normal.  Rhythm: regular  Rate: normal     Dental             Pulmonary    Pulmonary exam normal.                 Other findings            ASA: 2   Plan: MAC  NPO status verified and patient meets guidelines.          Plan/risks discussed with: patient and significant other              Present on Admission:  **None**

## 2024-10-08 ENCOUNTER — TELEPHONE (OUTPATIENT)
Dept: SURGERY | Facility: CLINIC | Age: 64
End: 2024-10-08

## 2024-10-08 NOTE — TELEPHONE ENCOUNTER
Called patient as she is on our wait list to offer her a sooner date of 1/13/2025. Patient explained that she is being seen by cardiology as she developed heart failure from her oncologic treatments and she is unsure if she is able to move her surgery up. She has another echo scheduled in November and will know how her treatment is going then.  Discussed the above with PRAFUL Martinez. Decided that it would be best to keep patient's original surgical date until we know more about how her heart failure treatment is going. Called patient back to inform her. Patient verbalized understanding, and will keep our office updated on her cardiac status.

## 2024-12-05 ENCOUNTER — TELEPHONE (OUTPATIENT)
Dept: SURGERY | Facility: CLINIC | Age: 64
End: 2024-12-05

## 2024-12-06 NOTE — TELEPHONE ENCOUNTER
Contacted patient to discuss her condition as the last time we spoke she noted she was diagnosed with heart failure.  Patient explained that her ejection fraction has mildly recovered, and both her cardiologist and EP have approved her for ASHLEY flap surgery. She is scheduled to have an ICD/pacemaker placed on 12/13/24, and would need only wait 1 month after this procedure to pursue ASHLEY surgery. Her doctors explained to her that this device would need to be turned off for her ASHLEY surgery.  Explained to patient that it would be best to reschedule her appointment to a later time to better discuss her options and to allow time for her to recover from her upcoming surgery. Patient agreeable, will reschedule patient. Encouraged her to call back with any changes or updates.

## 2024-12-11 ENCOUNTER — MED REC SCAN ONLY (OUTPATIENT)
Dept: FAMILY MEDICINE CLINIC | Facility: CLINIC | Age: 64
End: 2024-12-11

## 2025-02-07 ENCOUNTER — OFFICE VISIT (OUTPATIENT)
Dept: SURGERY | Facility: CLINIC | Age: 65
End: 2025-02-07
Payer: COMMERCIAL

## 2025-02-07 DIAGNOSIS — Z90.13 ABSENCE OF BREAST, BILATERAL: Primary | ICD-10-CM

## 2025-02-07 PROCEDURE — 99213 OFFICE O/P EST LOW 20 MIN: CPT | Performed by: SURGERY

## 2025-02-07 NOTE — PROGRESS NOTES
Daria Piedra is a 64 year old female who presents today in follow-up.  The patient had an ICD/pacemaker placed in December.  She presents here to review reconstructive options.  She is interested in autologous reconstruction or going flat.      Physical Examination:  Bilateral breast incisions are clean dry and intact.  There is no erythema or seroma noted.  Acceptable shape and symmetry is noted.  An AICD is noted in the right upper chest.  Abdomen:    Abdomen:  A moderate abdominal pannus with striae is noted.  A well-healed low transverse as well as lower midline scar is noted.  No palpable hernia is detected.      Assessment and Plan:  Surgical treatment options were reviewed with the patient.  Given the interim placement of the AICD and its location, I have advised against autologous reconstruction as such.  We did discuss the option of implant placement versus going flat.  The advantages disadvantages of each approach were discussed.  As the patient is highly motivated to pursue autologous reconstruction, I recommended that she seek a second opinion.  She would then inform our office when she is reached a conclusion.  The plan is reviewed with the patient and questions were answered. A total of 20 minutes was spent reviewing the patient's history and diagnostic testing, examining the patient, reviewing reconstructive options, and coordinating the patient's care.

## 2025-02-21 ENCOUNTER — DOCUMENTATION ONLY (OUTPATIENT)
Dept: SURGERY | Facility: CLINIC | Age: 65
End: 2025-02-21

## 2025-02-21 ENCOUNTER — TELEPHONE (OUTPATIENT)
Dept: SURGERY | Facility: CLINIC | Age: 65
End: 2025-02-21

## 2025-02-21 NOTE — PATIENT INSTRUCTIONS
This is not a pre-op    Surgeon:         Dr. Jeff Sena                                        Tel:         [phone]                                  Fax:        [phone]    Surgery/Procedure: Second stage reconstruction with removal of bilateral breast tissue expanders, placement of permanent implants and autologous fat grafting to the bilateral breasts. 2.5 hours, general anesthesia, outpatient.      Dx Code: Z90.13    Hospital:  [facility]: [address] [place] [address]           [phone]  [facility]: [address] [place] [address]               [phone]    1. Someone will need to drive you to and from the hospital if your procedure is outpatient.    2.Do not drink alcohol or smoke 24 hours prior to your procedure.    3. Bring a picture ID and your insurance card.    4. You will be contacted by the hospital the day before to confirm the procedure time and location.     5. Do not take any herbal supplements or blood thinners at least one week before your procedure/surgery. This includes NSAID's (aspirin, baby aspirin, Motrin, Ibuprofen, Aleve, Advil, Naproxen, etc), Fish oil, vitamin E, turmeric, CoQ10, or green tea supplements, etc. *TYLENOL or acetaminophen is ok to take*    6. PRE-OPERATIVE TESTING: History and physical with medical clearance is REQUIRED within 30 days of the surgery date and is mandatory per Dr. Sena. *If this is not done, your surgery will be postponed*  MEDICAL CLEARANCE WITH  ____  CBC  CMP  EKG (within 90 days)    7. If you take Coumadin, Plavix, Xarelto, or Eliquis, please contact your prescribing physician for special instructions on how long to hold. If you take insulin, contact your primary care physician for special instructions.     8. Please inform us if you start or change any medications at least one week before surgery (ex: blood thinners, weight loss medications, diabetic medications, herbal supplements, etc)    9. Does  patient have diagnosis of sleep apnea?    [   ] Yes     [   ]  No    Consent obtained  Photos taken on ______

## 2025-02-21 NOTE — TELEPHONE ENCOUNTER
Called patient to follow up and evaluate if she has made a decision on her surgical plan. Patient states she has decided to go with implants with Dr. Sena. Answered questions about implant surgery, but explained to patient that she will need a preoperative appointment with Dr. Sena before surgery to go over the procedure in detail. Patient was appreciative of the information.  Notified surgical scheduling.

## 2025-02-24 ENCOUNTER — TELEPHONE (OUTPATIENT)
Dept: SURGERY | Facility: CLINIC | Age: 65
End: 2025-02-24

## 2025-02-24 ENCOUNTER — PATIENT MESSAGE (OUTPATIENT)
Dept: FAMILY MEDICINE CLINIC | Facility: CLINIC | Age: 65
End: 2025-02-24

## 2025-02-24 DIAGNOSIS — Z90.13 STATUS POST BILATERAL MASTECTOMY: Primary | ICD-10-CM

## 2025-02-24 NOTE — TELEPHONE ENCOUNTER
Calling pt in regards to scheduling surgery.  Informed pt that I have 03/26/2025 available at [facility] with Dr. Sena.  Pt verbalized understanding and in agreement with date and location.  All questions answered.   Encouraged pt to call or [facility] message office with any other questions or concerns.

## 2025-02-25 ENCOUNTER — TELEPHONE (OUTPATIENT)
Dept: FAMILY MEDICINE CLINIC | Facility: CLINIC | Age: 65
End: 2025-02-25

## 2025-02-25 DIAGNOSIS — Z01.818 PRE-OP TESTING: Primary | ICD-10-CM

## 2025-02-25 NOTE — TELEPHONE ENCOUNTER
Patient is scheduled for her pre op on 3/24/25 her surgery date is on 3/26/25. Patient would like the lab orders put in asap. The pre op paper work is under letters in the patients chart. Please Advise

## 2025-02-25 NOTE — TELEPHONE ENCOUNTER
Please see pended orders and approve if appropriate.  Thank you.    Per letter, patient needs an CMP and CBC within 30 days prior to surgery and an EKG 90 days prior.    Surgery is scheduled on 3/26/2025 for second stage reconstruction with removal of B/L breast tissue expanders and replacement of permanent implants.

## 2025-02-27 ENCOUNTER — TELEPHONE (OUTPATIENT)
Dept: SURGERY | Facility: CLINIC | Age: 65
End: 2025-02-27

## 2025-02-27 DIAGNOSIS — Z90.13 ABSENCE OF BREAST, BILATERAL: Primary | ICD-10-CM

## 2025-02-27 NOTE — TELEPHONE ENCOUNTER
Patient informed of pre op orders placed. States that she does not need the order for EKG since she has an ICD and she's scheduled for follow up appointments with her cardiologist on 3/13 and 3/17 and will get EKG done in one of her appointments. Patient will fax over results to our office once done and received.

## 2025-02-27 NOTE — TELEPHONE ENCOUNTER
Called patient and explained we needed to move her surgery to 04/23/2025 at [facility] with Dr. Sena and patient is aware

## 2025-03-03 DIAGNOSIS — Z90.13 ABSENCE OF BREAST, BILATERAL: Primary | ICD-10-CM

## 2025-07-18 ENCOUNTER — MED REC SCAN ONLY (OUTPATIENT)
Dept: FAMILY MEDICINE CLINIC | Facility: CLINIC | Age: 65
End: 2025-07-18

## (undated) DEVICE — DRAPE HALF 40X58 DYNJP2410

## (undated) DEVICE — LIGASURE EXACT DISSECTOR: Brand: LIGASURE

## (undated) DEVICE — TISSUE RETRIEVAL SYSTEM: Brand: INZII RETRIEVAL SYSTEM

## (undated) DEVICE — TROCAR: Brand: KII SHIELDED BLADED ACCESS SYSTEM

## (undated) DEVICE — TRAY CATH 16FR F INCL BARDX IC COMPLT CARE

## (undated) DEVICE — SOL  .9 1000ML BAG

## (undated) DEVICE — TIGERTAIL 5F FLXTIP 70CM

## (undated) DEVICE — CLIP LIG M BLU TI HRT SHP WIRE HORZ

## (undated) DEVICE — STERILE POLYISOPRENE POWDER-FREE SURGICAL GLOVES: Brand: PROTEXIS

## (undated) DEVICE — PROVE COVER: Brand: UNBRANDED

## (undated) DEVICE — SURG GL, SENSICARE PI ORTHO LT,LF,PF,8.5: Brand: MEDLINE

## (undated) DEVICE — ANTIBACTERIAL UNDYED BRAIDED (POLYGLACTIN 910), SYNTHETIC ABSORBABLE SUTURE: Brand: COATED VICRYL

## (undated) DEVICE — DRAIN SUR 15FR L3/16IN DIA4.7MM SIL RND

## (undated) DEVICE — PLASTIC BREAST CDS-LF: Brand: MEDLINE INDUSTRIES, INC.

## (undated) DEVICE — SUTURE VCRL SZ 3-0 L27IN ABSRB UD L26MM SH

## (undated) DEVICE — SYRINGE BULB 50/CS 48/PLT: Brand: MEDEGEN MEDICAL PRODUCTS, LLC

## (undated) DEVICE — LAPAROTOMY SPONGE - RF AND X-RAY DETECTABLE PRE-WASHED: Brand: SITUATE

## (undated) DEVICE — 3M™ STERI-STRIP™ REINFORCED ADHESIVE SKIN CLOSURES, R1548, 1 IN X 5 IN (25 MM X 125 MM), 4 STRIPS/ENVELOPE: Brand: 3M™ STERI-STRIP™

## (undated) DEVICE — 450 ML BOTTLE OF 0.05% CHLORHEXIDINE GLUCONATE IN 99.95% STERILE WATER FOR IRRIGATION, USP AND APPLICATOR.: Brand: IRRISEPT ANTIMICROBIAL WOUND LAVAGE

## (undated) DEVICE — ELECTRODE ES 2.75IN PTFE BLDE MOD E-Z CLN

## (undated) DEVICE — SUT ETHILON 4-0 PS-2 1667H

## (undated) DEVICE — GAUZE TRAY STERILE 4X4 12PLY

## (undated) DEVICE — SUT PERMA- 0 18IN FSL NABSRB BLK L30MM 3/8

## (undated) DEVICE — BREAST-HERNIA-PORT CDS-LF: Brand: MEDLINE INDUSTRIES, INC.

## (undated) DEVICE — SUTURE PROL SZ 2-0 L30IN NONABSORB BLU

## (undated) DEVICE — SUT MCRYL 4-0 18IN PS-2 ABSRB UD 19MM 3/8 CIR

## (undated) DEVICE — Device

## (undated) DEVICE — MINI-BLADE®: Brand: BEAVER®

## (undated) DEVICE — CURAD OIL EMLUSION GAUZE 3X3

## (undated) DEVICE — 4-WAY HIGH FLOW STOPCOCK W/ROTATING LUER: Brand: ICU MEDICAL

## (undated) DEVICE — MARKER SKIN PREP RESIST STRL

## (undated) DEVICE — GLOVE SUR 7.5 SENSICARE PI PIP CRM PWD F

## (undated) DEVICE — STANDARD HYPODERMIC NEEDLE,POLYPROPYLENE HUB: Brand: MONOJECT

## (undated) DEVICE — DISPOSABLE BIPOLAR FORCEPS 4" (10.2CM) JEWELERS, STRAIGHT 0.4MM TIP AND 12 FT. (3.6M) CABLE: Brand: KIRWAN

## (undated) DEVICE — LAP CHOLE/APPY CDS-LF: Brand: MEDLINE INDUSTRIES, INC.

## (undated) DEVICE — ADHESIVE SKIN TOP FOR WND CLSR DERMBND ADV

## (undated) DEVICE — UNDYED BRAIDED (POLYGLACTIN 910), SYNTHETIC ABSORBABLE SUTURE: Brand: COATED VICRYL

## (undated) DEVICE — ALCOHOL 70% 4 OZ

## (undated) DEVICE — VIOLET BRAIDED (POLYGLACTIN 910), SYNTHETIC ABSORBABLE SUTURE: Brand: COATED VICRYL

## (undated) DEVICE — APPLICATOR PREP 26ML CHG 2% ISO ALC 70%

## (undated) DEVICE — COVER LT HNDL RIG FOR SUR CAM DISP

## (undated) DEVICE — STERILE POLYISOPRENE POWDER-FREE SURGICAL GLOVES WITH EMOLLIENT COATING: Brand: PROTEXIS

## (undated) DEVICE — SUTURE ETHIBOND 2-0 CT-2

## (undated) DEVICE — SYRINGE MED 5ML STD CLR PLAS LL TIP N CTRL

## (undated) DEVICE — STERILE SYNTHETIC POLYISOPRENE POWDER-FREE SURGICAL GLOVES WITH HYDROGEL COATING, SMOOTH FINISH, STRAIGHT FINGER: Brand: PROTEXIS

## (undated) DEVICE — BNDG COMPR W1INXL5YD FOAM

## (undated) DEVICE — DRAPE,TOWEL,LARGE,INVISISHIELD: Brand: MEDLINE

## (undated) DEVICE — C-ARM: Brand: UNBRANDED

## (undated) DEVICE — SUT PDS II 2-0 27IN SH ABSRB VLT L26MM 1/2

## (undated) DEVICE — LIGHT HANDLE

## (undated) DEVICE — SUT ETHLN 3-0 18IN FS-1 NABSRB BLK 24MM 3/8 C

## (undated) DEVICE — SOLUTION IRRIG 1000ML 0.9% NACL USP BTL

## (undated) DEVICE — 3M™ TEGADERM™ TRANSPARENT FILM DRESSING FRAME STYLE, 1624W, 2-3/8 IN X 2-3/4 IN (6 CM X 7 CM), 100/CT 4CT/CASE: Brand: 3M™ TEGADERM™

## (undated) DEVICE — DRAPE C-ARM UNIVERSAL

## (undated) DEVICE — BANDAID COVERLET 1X3

## (undated) DEVICE — GAUZE,SPONGE,FLUFF,6"X6.75",STRL,5/TRAY: Brand: MEDLINE

## (undated) DEVICE — SOLUTION PREP 4OZ 10% POVIDONE IOD SCR TOP

## (undated) DEVICE — GLOVE SUR 6.5 SENSICARE PI PIP CRM PWD F

## (undated) DEVICE — SLEEVE COMPR MD KNEE LEN SGL USE KENDALL SCD

## (undated) DEVICE — EVACUATOR SUR 100CC SIL BLB WND

## (undated) DEVICE — SUT SLK 2-0 30IN MULTPK BK

## (undated) DEVICE — SUT PROL 2-0 30IN SH NABSRB BLU L26MM 1/2 CIR

## (undated) DEVICE — CHLORAPREP 26ML APPLICATOR

## (undated) DEVICE — SYRINGE 30ML LL TIP

## (undated) DEVICE — NITINOL WIRE STR 035

## (undated) DEVICE — DISPOSABLE LAPAROSCOPIC CLIP APPLIER WITH 20 CLIPS.: Brand: EPIX® UNIVERSAL CLIP APPLIER

## (undated) DEVICE — SUTURE VICRYL 5-0 P-3

## (undated) DEVICE — SYRINGE MED 50ML LL TIP DISP

## (undated) DEVICE — SUTURE VICRYL 0 UR-6

## (undated) DEVICE — AEGIS 1" DISK 4MM HOLE, PEEL OPEN: Brand: MEDLINE

## (undated) DEVICE — UPPER EXTREMITY CDS-LF: Brand: MEDLINE INDUSTRIES, INC.

## (undated) DEVICE — 40580 - THE PINK PAD - ADVANCED TRENDELENBURG POSITIONING KIT: Brand: 40580 - THE PINK PAD - ADVANCED TRENDELENBURG POSITIONING KIT

## (undated) DEVICE — SPECIMAN CONTAINER 4OZ STERILE

## (undated) DEVICE — KENDALL SCD EXPRESS SLEEVES, KNEE LENGTH, MEDIUM: Brand: KENDALL SCD

## (undated) DEVICE — SOL  .9 1000ML BTL

## (undated) DEVICE — GLOVE BIOGEL M SURG SZ 6-1/2

## (undated) DEVICE — SLEEVE COMPR M KNEE LEN SGL USE KENDALL SCD

## (undated) DEVICE — NON-ADHERENT PAD PREPACK: Brand: TELFA

## (undated) DEVICE — 3M™ IOBAN™ 2 ANTIMICROBIAL INCISE DRAPE 6651EZ: Brand: IOBAN™ 2

## (undated) DEVICE — SUTURE VICRYL 0

## (undated) DEVICE — COVER,LIGHT,CAMERA,HARD,1/PK,STRL: Brand: MEDLINE

## (undated) DEVICE — E-Z CLEAN, NON-STICK, PTFE COATED, ELECTROSURGICAL BLADE ELECTRODE, MODIFIED EXTENDED INSULATION, 4 INCH (10.2 CM): Brand: MEGADYNE

## (undated) DEVICE — TROCAR: Brand: KII® SLEEVE

## (undated) DEVICE — SOL NACL IRRIG 0.9% 1000ML BTL

## (undated) NOTE — LETTER
Consent for Coronary CT Angiography    Date of Procedure: 3/27/2024    * CTA Fractional Flow Reserve Reserve Analysis   * CTA Gated Coronary Arteries with Calcium  on Daria Piedra    Your doctor has recommended that you have a Coronary CT Angiography procedure. Coronary CT Angiography is a diagnostic procedure using computed tomography to scan the chest and coronary arteries. It is a non-invasive technique that allows clear visualization of narrowed and blocked arteries. Blockage in these arteries may lead to heart attack or stroke.    You will lie on a table that slides slowly into a large circular opening called the CT gantry. The procedure will be performed by the CT Staff, including a nurse, a technologist, and a patient assistant. The staff will be with you throughout the entire procedure to explain each step, make you as comfortable as possible, and answer all of your questions. The staff will take a series of images of your heart and chest to help define the area to be imaged. You will be asked to hold your breath for a short time as each series of images is performed and acquired.     You may receive medication called a beta blocker that will slow your hear rate down. This is needed so we can obtain better images of your heart. You may also receive a nitroglycerine spray under your tongue. This medication is given to dilate the arteries for better picture quality. The nitroglycerine may cause a drop in blood pressure. During the procedure you will receive an injection of a contrast material, which assists the physician in highlighting the anatomy of your heart. You may experience a warm sensation through your body and a metallic taste in your mouth. In rare circumstances, a rash and/or hives may occur. It's very important to inform your care giver of any changes you may feel or experience.     The medication and the contrast material used as part of your procedure are all deemed very safe, however there  is always an element of risk to a patient when taking medication. Allergic reactions to medication can range from very minor to very serious, leading to a life threatening situation or even death. Please be sure to communicate any allergy you may have to your caregiver immediately.    The information that is obtained during your testing will be treated as privileged and confidential. The information obtained will be given to your doctor and may be used for insurance purposes or to track and trend data as part of  with your privacy retained.     I, Daria Piedra, have read and understand the above information. I voluntarily agree and consent to have the Coronary CT Angiography (CTA) Exam. Furthermore, no guarantees or assurances have been given by anyone as to the results that may be obtained from this procedure. Any questions that may have occurred to me have been answered to my satisfaction.    Signature of Patient: __________________________Date: ___________Time: _______      Patient Name: Daria Piedra    : 1960      Printed: 2024      Medical Record #: [id number]

## (undated) NOTE — LETTER
OUTSIDE TESTING RESULT REQUEST     IMPORTANT: FOR YOUR IMMEDIATE ATTENTION  Please FAX all test results listed below to: [phone]     Testing already done on or about: 24     * * * * If testing is NOT complete, arrange with patient A.S.A.P. * * * *      Patient Name: Daria Piedra  Surgery Date: 2024  Medical Record: [id number]  CSN: [id number]  : 1960 - A: 63 y     Sex: female  Surgeon(s):  Khurram Espitia MD Pavone, Lucio, MD  Procedure: RIGHT BREAST MASTECTOMY, SENTINEL LYMPH NODE BIOPSY RIGHT BREAST AND POSSIBLE AXILLARY LYMPH NODE DISSECTION, LEFT BREAST MASTECTOMY ([place]) Immediate breast reconstruction with placement of bilateral breast tissue expanders and acellular dermal matrix (GILLES)  Anesthesia Type: General     Surgeon: Khurram Espitia MD     The following Testing and Time Line are REQUIRED PER ANESTHESIA     EKG READ AND SIGNED WITHIN   90 days      Thank You,   Sent by:Dana Patel RN

## (undated) NOTE — MR AVS SNAPSHOT
[facility], [address] [address] [phone]               Thank you for choosing us for your health care visit with Cierra Cortes PA-C.   We are glad to serve you and happy to provide you with this ! Ask your nurse or doctor about these medications    - Mometasone Furo-Formoterol Fum 100-5 MCG/ACT Aero            MyChart     Visit MyChart  You can access your MyChart to more actively manage your health care and view more details from this visit by suellen

## (undated) NOTE — LETTER
18    Patient: Eric Ambriz  : 1960 Visit date: 2018    Dear  Mariana Hubbard PA-C and Dr. Josh Carr MD,    Thank you for referring Eric Ambriz to my practice. Please find my assessment and plan below.           49-year-old female

## (undated) NOTE — LETTER
OUTSIDE TESTING RESULT REQUEST     IMPORTANT: FOR YOUR IMMEDIATE ATTENTION  Please FAX all test results listed below to: [phone]     Testing already done on or about: 24    * * * * If testing is NOT complete, arrange with patient A.S.A.P. * * * *      Patient Name: Daria Piedra  Surgery Date: 2024  Medical Record: [id number]  CSN: [id number]  : 1960 - A: 64 y     Sex: female  Surgeon(s):  Khurram Espitia MD  Procedure: REMOVAL OF PORT-A-CATHETER  Anesthesia Type: MAC     Surgeon: Khurram Espitia MD     The following Testing and Time Line are REQUIRED PER ANESTHESIA     EKG READ AND SIGNED WITHIN   90 days      Thank You,   Sent by:RUSSELL BRAVO

## (undated) NOTE — LETTER
OUTSIDE TESTING RESULT REQUEST     IMPORTANT: FOR YOUR IMMEDIATE ATTENTION  Please FAX all test results listed below to: [phone]     Testing already done on or about: 24     * * * * If testing is NOT complete, arrange with patient A.S.A.P. * * * *      Patient Name: Daria Piedra  Surgery Date: 2024  Medical Record: [id number]  CSN: [id number]  : 1960 - A: 63 y     Sex: female  Surgeon(s):  Khurram Espitia MD Pavone, Lucio, MD  Procedure: RIGHT BREAST MASTECTOMY, SENTINEL LYMPH NODE BIOPSY RIGHT BREAST AND POSSIBLE AXILLARY LYMPH NODE DISSECTION, LEFT BREAST MASTECTOMY ([place]) Immediate breast reconstruction with placement of bilateral breast tissue expanders and acellular dermal matrix (GILLES)  Anesthesia Type: General     Surgeon: Khurram Espitia MD     The following Testing and Time Line are REQUIRED PER ANESTHESIA     CBC [with Differential & Platelets] within  60 days  CMP (requires 4 hour fast) within  60 days      Thank You,   Sent by:Dana Patel RN

## (undated) NOTE — LETTER
18    Patient: Doug Dickens  : 1960 Visit date: 2018    Dear  Dr. Koko Fu MD,    Thank you for referring Doug Dickens to my practice. Please find my assessment and plan below.        Assessment   Biliary dyskinesia  (primary encounte